# Patient Record
Sex: FEMALE | Race: WHITE | NOT HISPANIC OR LATINO | Employment: PART TIME | URBAN - NONMETROPOLITAN AREA
[De-identification: names, ages, dates, MRNs, and addresses within clinical notes are randomized per-mention and may not be internally consistent; named-entity substitution may affect disease eponyms.]

---

## 2018-10-11 ENCOUNTER — HISTORICAL (OUTPATIENT)
Dept: ADMINISTRATIVE | Facility: HOSPITAL | Age: 78
End: 2018-10-11

## 2019-04-16 ENCOUNTER — HISTORICAL (OUTPATIENT)
Dept: ADMINISTRATIVE | Facility: HOSPITAL | Age: 79
End: 2019-04-16

## 2019-09-03 ENCOUNTER — HISTORICAL (OUTPATIENT)
Dept: ADMINISTRATIVE | Facility: HOSPITAL | Age: 79
End: 2019-09-03

## 2019-10-04 ENCOUNTER — HISTORICAL (OUTPATIENT)
Dept: ADMINISTRATIVE | Facility: HOSPITAL | Age: 79
End: 2019-10-04

## 2019-10-15 ENCOUNTER — HISTORICAL (OUTPATIENT)
Dept: ADMINISTRATIVE | Facility: HOSPITAL | Age: 79
End: 2019-10-15

## 2020-05-05 ENCOUNTER — HISTORICAL (OUTPATIENT)
Dept: ADMINISTRATIVE | Facility: HOSPITAL | Age: 80
End: 2020-05-05

## 2020-11-11 LAB
ALBUMIN SERPL-MCNC: 4 G/DL (ref 3.4–5)
ALBUMIN/GLOB SERPL: 1.5 {RATIO}
ALP SERPL-CCNC: 79 U/L (ref 50–144)
ALT SERPL-CCNC: 15 U/L (ref 1–45)
ANION GAP SERPL CALC-SCNC: 7 MMOL/L (ref 7–16)
AST SERPL-CCNC: 32 U/L (ref 14–36)
BASOPHILS # BLD AUTO: 0.02 X10(3)/MCL (ref 0.01–0.08)
BASOPHILS NFR BLD AUTO: 0.3 % (ref 0.1–1.2)
BILIRUB SERPL-MCNC: 0.43 MG/DL (ref 0.1–1)
BUN SERPL-MCNC: 12 MG/DL (ref 7–20)
CALCIUM SERPL-MCNC: 10 MG/DL (ref 8.4–10.2)
CHLORIDE SERPL-SCNC: 100 MMOL/L (ref 94–110)
CHOLEST SERPL-MCNC: 152 MG/DL (ref 0–200)
CO2 SERPL-SCNC: 29 MMOL/L (ref 21–32)
CREAT SERPL-MCNC: 0.5 MG/DL (ref 0.52–1.04)
CREAT/UREA NIT SERPL: 24 (ref 12–20)
EOSINOPHIL # BLD AUTO: 0.14 X10(3)/MCL (ref 0.04–0.36)
EOSINOPHIL NFR BLD AUTO: 1.9 % (ref 0.7–7)
ERYTHROCYTE [DISTWIDTH] IN BLOOD BY AUTOMATED COUNT: 13.8 % (ref 11–14.5)
GLOBULIN SER-MCNC: 2.7 G/DL (ref 2–3.9)
GLUCOSE SERPL-MCNC: 107 MGM./DL (ref 70–115)
HCT VFR BLD AUTO: 42.7 % (ref 36–48)
HDLC SERPL-MCNC: 64 MG/DL (ref 40–60)
HGB BLD-MCNC: 14 G/DL (ref 11.8–16)
IMM GRANULOCYTES # BLD AUTO: 0.02 10*3/UL (ref 0–0.03)
IMM GRANULOCYTES NFR BLD AUTO: 0.3 % (ref 0–0.5)
LDLC SERPL CALC-MCNC: 59.8 MG/DL (ref 30–100)
LYMPHOCYTES # BLD AUTO: 2.69 X10(3)/MCL (ref 1.16–3.74)
LYMPHOCYTES NFR BLD AUTO: 36.8 % (ref 20–55)
MCH RBC QN AUTO: 31 PG (ref 27–34)
MCHC RBC AUTO-ENTMCNC: 32.8 G/DL (ref 31–37)
MCV RBC AUTO: 94.5 FL (ref 79–99)
MONOCYTES # BLD AUTO: 0.77 X10(3)/MCL (ref 0.24–0.36)
MONOCYTES NFR BLD AUTO: 10.5 % (ref 4.7–12.5)
NEUTROPHILS # BLD AUTO: 3.66 X10(3)/MCL (ref 1.56–6.13)
NEUTROPHILS NFR BLD AUTO: 50.2 % (ref 37–73)
PLATELET # BLD AUTO: 246 X10(3)/MCL (ref 140–371)
PMV BLD AUTO: 10.5 FL (ref 9.4–12.4)
POTASSIUM SERPL-SCNC: 4.6 MMOL/L (ref 3.5–5.1)
PROT SERPL-MCNC: 6.7 G/DL (ref 6.3–8.2)
RBC # BLD AUTO: 4.52 X10(6)/MCL (ref 4–5.1)
SODIUM SERPL-SCNC: 136 MMOL/L (ref 135–145)
TRIGL SERPL-MCNC: 80 MG/DL (ref 30–200)
TSH SERPL-ACNC: 4.96 UIU/ML (ref 0.36–3.74)
WBC # SPEC AUTO: 7.3 X10(3)/MCL (ref 4–11.5)

## 2021-03-10 LAB
ALBUMIN SERPL-MCNC: 4.2 G/DL (ref 3.4–5)
ALBUMIN/GLOB SERPL: 1.8 {RATIO}
ALP SERPL-CCNC: 85 U/L (ref 50–144)
ALT SERPL-CCNC: 16 U/L (ref 1–45)
ANION GAP SERPL CALC-SCNC: 5 MMOL/L (ref 7–16)
AST SERPL-CCNC: 28 U/L (ref 14–36)
BASOPHILS # BLD AUTO: 0.03 X10(3)/MCL (ref 0.01–0.08)
BASOPHILS NFR BLD AUTO: 0.5 % (ref 0.1–1.2)
BILIRUB SERPL-MCNC: 0.61 MG/DL (ref 0.1–1)
BUN SERPL-MCNC: 13 MG/DL (ref 7–20)
CALCIUM SERPL-MCNC: 10.2 MG/DL (ref 8.4–10.2)
CHLORIDE SERPL-SCNC: 98 MMOL/L (ref 94–110)
CHOLEST SERPL-MCNC: 153 MG/DL (ref 0–200)
CO2 SERPL-SCNC: 30 MMOL/L (ref 21–32)
CREAT SERPL-MCNC: 0.6 MG/DL (ref 0.52–1.04)
CREAT/UREA NIT SERPL: 21.7 (ref 12–20)
DEPRECATED CALCIDIOL+CALCIFEROL SERPL-MC: 28 NG/ML (ref 30–100)
EOSINOPHIL # BLD AUTO: 0.3 X10(3)/MCL (ref 0.04–0.36)
EOSINOPHIL NFR BLD AUTO: 5.2 % (ref 0.7–7)
ERYTHROCYTE [DISTWIDTH] IN BLOOD BY AUTOMATED COUNT: 13.6 % (ref 11–14.5)
EST. AVERAGE GLUCOSE BLD GHB EST-MCNC: 108 MG/DL (ref 70–115)
GLOBULIN SER-MCNC: 2.4 G/DL (ref 2–3.9)
GLUCOSE SERPL-MCNC: 120 MGM./DL (ref 70–115)
HBA1C MFR BLD: 5.6 % (ref 4–6)
HCT VFR BLD AUTO: 44.5 % (ref 36–48)
HDLC SERPL-MCNC: 65 MG/DL (ref 40–60)
HGB BLD-MCNC: 14.4 G/DL (ref 11.8–16)
IMM GRANULOCYTES # BLD AUTO: 0.01 X10E3/UL (ref 0–0.03)
IMM GRANULOCYTES NFR BLD AUTO: 0.2 % (ref 0–0.5)
LDLC SERPL CALC-MCNC: 68.5 MG/DL (ref 30–100)
LYMPHOCYTES # BLD AUTO: 1.9 X10(3)/MCL (ref 1.16–3.74)
LYMPHOCYTES NFR BLD AUTO: 32.7 % (ref 20–55)
MCH RBC QN AUTO: 30.6 PG (ref 27–34)
MCHC RBC AUTO-ENTMCNC: 32.4 G/DL (ref 31–37)
MCV RBC AUTO: 94.5 FL (ref 79–99)
MONOCYTES # BLD AUTO: 0.63 X10(3)/MCL (ref 0.24–0.36)
MONOCYTES NFR BLD AUTO: 10.8 % (ref 4.7–12.5)
NEUTROPHILS # BLD AUTO: 2.94 X10(3)/MCL (ref 1.56–6.13)
NEUTROPHILS NFR BLD AUTO: 50.6 % (ref 37–73)
PLATELET # BLD AUTO: 239 X10(3)/MCL (ref 140–371)
PMV BLD AUTO: 10.4 FL (ref 9.4–12.4)
POTASSIUM SERPL-SCNC: 4.5 MMOL/L (ref 3.5–5.1)
PROT SERPL-MCNC: 6.6 G/DL (ref 6.3–8.2)
RBC # BLD AUTO: 4.71 X10(6)/MCL (ref 4–5.1)
SODIUM SERPL-SCNC: 133 MMOL/L (ref 135–145)
TRIGL SERPL-MCNC: 85 MG/DL (ref 30–200)
TSH SERPL-ACNC: 3.59 UIU/ML (ref 0.36–3.74)
WBC # SPEC AUTO: 5.8 X10(3)/MCL (ref 4–11.5)

## 2021-05-13 LAB
ALBUMIN SERPL-MCNC: 4.4 G/DL (ref 3.4–5)
ALBUMIN/GLOB SERPL: 1.8 {RATIO}
ALP SERPL-CCNC: 83 U/L (ref 50–144)
ALT SERPL-CCNC: 17 U/L (ref 1–45)
ANION GAP SERPL CALC-SCNC: 6 MMOL/L (ref 7–16)
AST SERPL-CCNC: 24 U/L (ref 14–36)
BASOPHILS # BLD AUTO: 0.03 X10(3)/MCL (ref 0.01–0.08)
BASOPHILS NFR BLD AUTO: 0.5 % (ref 0.1–1.2)
BILIRUB SERPL-MCNC: 0.66 MG/DL (ref 0.1–1)
BUN SERPL-MCNC: 13 MG/DL (ref 7–20)
CALCIUM SERPL-MCNC: 10.2 MG/DL (ref 8.4–10.2)
CHLORIDE SERPL-SCNC: 102 MMOL/L (ref 94–110)
CHOLEST SERPL-MCNC: 149 MG/DL (ref 0–200)
CO2 SERPL-SCNC: 29 MMOL/L (ref 21–32)
CREAT SERPL-MCNC: 0.6 MG/DL (ref 0.52–1.04)
CREAT/UREA NIT SERPL: 21.7 (ref 12–20)
DEPRECATED CALCIDIOL+CALCIFEROL SERPL-MC: 32.4 NG/ML (ref 30–100)
EOSINOPHIL # BLD AUTO: 0.34 X10(3)/MCL (ref 0.04–0.36)
EOSINOPHIL NFR BLD AUTO: 5.7 % (ref 0.7–7)
ERYTHROCYTE [DISTWIDTH] IN BLOOD BY AUTOMATED COUNT: 13.6 % (ref 11–14.5)
EST. AVERAGE GLUCOSE BLD GHB EST-MCNC: 114 MG/DL (ref 70–115)
GLOBULIN SER-MCNC: 2.5 G/DL (ref 2–3.9)
GLUCOSE SERPL-MCNC: 100 MGM./DL (ref 70–115)
HBA1C MFR BLD: 5.8 % (ref 4–6)
HCT VFR BLD AUTO: 44.4 % (ref 36–48)
HDLC SERPL-MCNC: 62 MG/DL (ref 40–60)
HGB BLD-MCNC: 14.2 G/DL (ref 11.8–16)
IMM GRANULOCYTES # BLD AUTO: 0.02 X10E3/UL (ref 0–0.03)
IMM GRANULOCYTES NFR BLD AUTO: 0.3 % (ref 0–0.5)
IRON SERPL-MCNC: 91 MCG/DL (ref 37–170)
LDLC SERPL CALC-MCNC: 68.3 MG/DL (ref 30–100)
LYMPHOCYTES # BLD AUTO: 2.01 X10(3)/MCL (ref 1.16–3.74)
LYMPHOCYTES NFR BLD AUTO: 33.7 % (ref 20–55)
MCH RBC QN AUTO: 30.7 PG (ref 27–34)
MCHC RBC AUTO-ENTMCNC: 32 G/DL (ref 31–37)
MCV RBC AUTO: 95.9 FL (ref 79–99)
MONOCYTES # BLD AUTO: 0.75 X10(3)/MCL (ref 0.24–0.36)
MONOCYTES NFR BLD AUTO: 12.6 % (ref 4.7–12.5)
NEUTROPHILS # BLD AUTO: 2.81 X10(3)/MCL (ref 1.56–6.13)
NEUTROPHILS NFR BLD AUTO: 47.2 % (ref 37–73)
PLATELET # BLD AUTO: 188 X10(3)/MCL (ref 140–371)
PMV BLD AUTO: 11.2 FL (ref 9.4–12.4)
POTASSIUM SERPL-SCNC: 4.5 MMOL/L (ref 3.5–5.1)
PROT SERPL-MCNC: 6.9 G/DL (ref 6.3–8.2)
RBC # BLD AUTO: 4.63 X10(6)/MCL (ref 4–5.1)
SODIUM SERPL-SCNC: 137 MMOL/L (ref 135–145)
TRIGL SERPL-MCNC: 79 MG/DL (ref 30–200)
TSH SERPL-ACNC: 4.4 UIU/ML (ref 0.36–3.74)
VIT B12 SERPL-MCNC: 510 PG/ML (ref 211–946)
WBC # SPEC AUTO: 6 X10(3)/MCL (ref 4–11.5)

## 2021-10-13 LAB
ALBUMIN SERPL-MCNC: 4.3 G/DL (ref 3.4–5)
ALBUMIN/GLOB SERPL: 2 {RATIO}
ALP SERPL-CCNC: 101 U/L (ref 50–144)
ALT SERPL-CCNC: 20 U/L (ref 1–45)
ANION GAP SERPL CALC-SCNC: 8 MMOL/L (ref 7–16)
AST SERPL-CCNC: 26 U/L (ref 14–36)
BASOPHILS # BLD AUTO: 0.04 X10(3)/MCL (ref 0.01–0.08)
BASOPHILS NFR BLD AUTO: 0.6 % (ref 0.1–1.2)
BILIRUB SERPL-MCNC: 0.53 MG/DL (ref 0.1–1)
BUN SERPL-MCNC: 16 MG/DL (ref 7–20)
CALCIUM SERPL-MCNC: 10 MG/DL (ref 8.4–10.2)
CHLORIDE SERPL-SCNC: 101 MMOL/L (ref 94–110)
CHOLEST SERPL-MCNC: 152 MG/DL (ref 0–200)
CO2 SERPL-SCNC: 28 MMOL/L (ref 21–32)
CREAT SERPL-MCNC: 0.59 MG/DL (ref 0.52–1.04)
CREAT/UREA NIT SERPL: 27.1 (ref 12–20)
DEPRECATED CALCIDIOL+CALCIFEROL SERPL-MC: 29.3 NG/ML (ref 30–100)
EOSINOPHIL # BLD AUTO: 0.28 X10(3)/MCL (ref 0.04–0.36)
EOSINOPHIL NFR BLD AUTO: 4.5 % (ref 0.7–7)
ERYTHROCYTE [DISTWIDTH] IN BLOOD BY AUTOMATED COUNT: 13.6 % (ref 11–14.5)
EST. AVERAGE GLUCOSE BLD GHB EST-MCNC: 108 MG/DL (ref 70–115)
GLOBULIN SER-MCNC: 2.2 G/DL (ref 2–3.9)
GLUCOSE SERPL-MCNC: 111 MGM./DL (ref 70–115)
HBA1C MFR BLD: 5.6 % (ref 4–6)
HCT VFR BLD AUTO: 42.6 % (ref 36–48)
HDLC SERPL-MCNC: 60 MG/DL (ref 40–60)
HGB BLD-MCNC: 14.1 G/DL (ref 11.8–16)
IMM GRANULOCYTES # BLD AUTO: 0.02 X10E3/UL (ref 0–0.03)
IMM GRANULOCYTES NFR BLD AUTO: 0.3 % (ref 0–0.5)
IRON SERPL-MCNC: 96 MCG/DL (ref 37–170)
LDLC SERPL CALC-MCNC: 65.3 MG/DL (ref 30–100)
LYMPHOCYTES # BLD AUTO: 1.99 X10(3)/MCL (ref 1.16–3.74)
LYMPHOCYTES NFR BLD AUTO: 32.2 % (ref 20–55)
MCH RBC QN AUTO: 30.8 PG (ref 27–34)
MCHC RBC AUTO-ENTMCNC: 33.1 G/DL (ref 31–37)
MCV RBC AUTO: 93 FL (ref 79–99)
MONOCYTES # BLD AUTO: 0.61 X10(3)/MCL (ref 0.24–0.36)
MONOCYTES NFR BLD AUTO: 9.9 % (ref 4.7–12.5)
NEUTROPHILS # BLD AUTO: 3.24 X10(3)/MCL (ref 1.56–6.13)
NEUTROPHILS NFR BLD AUTO: 52.5 % (ref 37–73)
PLATELET # BLD AUTO: 190 X10(3)/MCL (ref 140–371)
PMV BLD AUTO: 10.7 FL (ref 9.4–12.4)
POTASSIUM SERPL-SCNC: 5.1 MMOL/L (ref 3.5–5.1)
PROT SERPL-MCNC: 6.5 G/DL (ref 6.3–8.2)
RBC # BLD AUTO: 4.58 X10(6)/MCL (ref 4–5.1)
SODIUM SERPL-SCNC: 137 MMOL/L (ref 135–145)
T4 FREE SERPL-MCNC: 1.12 NG/DL (ref 0.78–2.19)
TRIGL SERPL-MCNC: 80 MG/DL (ref 30–200)
TSH SERPL-ACNC: 3.44 UIU/ML (ref 0.36–3.74)
WBC # SPEC AUTO: 6.2 X10(3)/MCL (ref 4–11.5)

## 2022-04-10 ENCOUNTER — HISTORICAL (OUTPATIENT)
Dept: ADMINISTRATIVE | Facility: HOSPITAL | Age: 82
End: 2022-04-10

## 2022-04-24 VITALS
HEIGHT: 61 IN | BODY MASS INDEX: 37.76 KG/M2 | WEIGHT: 200 LBS | DIASTOLIC BLOOD PRESSURE: 62 MMHG | SYSTOLIC BLOOD PRESSURE: 116 MMHG | OXYGEN SATURATION: 97 %

## 2022-05-05 ENCOUNTER — HISTORICAL (OUTPATIENT)
Dept: ADMINISTRATIVE | Facility: HOSPITAL | Age: 82
End: 2022-05-05

## 2022-11-01 LAB
HBA1C MFR BLD: 5.6 %
HEMOGLOBIN A1C: 5.6 % (ref ?–5.6)

## 2023-03-20 ENCOUNTER — OFFICE VISIT (OUTPATIENT)
Dept: FAMILY MEDICINE | Facility: CLINIC | Age: 83
End: 2023-03-20
Payer: MEDICARE

## 2023-03-20 VITALS
HEART RATE: 101 BPM | RESPIRATION RATE: 20 BRPM | OXYGEN SATURATION: 94 % | WEIGHT: 200 LBS | DIASTOLIC BLOOD PRESSURE: 73 MMHG | BODY MASS INDEX: 37.76 KG/M2 | SYSTOLIC BLOOD PRESSURE: 133 MMHG | TEMPERATURE: 98 F | HEIGHT: 61 IN

## 2023-03-20 DIAGNOSIS — R05.9 COUGH, UNSPECIFIED TYPE: ICD-10-CM

## 2023-03-20 DIAGNOSIS — J32.9 SINUSITIS, UNSPECIFIED CHRONICITY, UNSPECIFIED LOCATION: Primary | ICD-10-CM

## 2023-03-20 PROBLEM — E55.9 VITAMIN D DEFICIENCY: Status: ACTIVE | Noted: 2023-03-20

## 2023-03-20 PROBLEM — R73.03 PREDIABETES: Status: ACTIVE | Noted: 2023-03-20

## 2023-03-20 PROBLEM — J30.2 SEASONAL ALLERGIC RHINITIS: Status: ACTIVE | Noted: 2023-03-20

## 2023-03-20 PROBLEM — E78.2 MIXED HYPERLIPIDEMIA: Status: ACTIVE | Noted: 2023-03-20

## 2023-03-20 PROBLEM — I10 PRIMARY HYPERTENSION: Status: ACTIVE | Noted: 2023-03-20

## 2023-03-20 PROCEDURE — 96372 PR INJECTION,THERAP/PROPH/DIAG2ST, IM OR SUBCUT: ICD-10-PCS | Mod: ,,, | Performed by: NURSE PRACTITIONER

## 2023-03-20 PROCEDURE — 96372 THER/PROPH/DIAG INJ SC/IM: CPT | Mod: ,,, | Performed by: NURSE PRACTITIONER

## 2023-03-20 PROCEDURE — 99213 PR OFFICE/OUTPT VISIT, EST, LEVL III, 20-29 MIN: ICD-10-PCS | Mod: ,,, | Performed by: NURSE PRACTITIONER

## 2023-03-20 PROCEDURE — 99213 OFFICE O/P EST LOW 20 MIN: CPT | Mod: ,,, | Performed by: NURSE PRACTITIONER

## 2023-03-20 RX ORDER — CEFTRIAXONE 1 G/1
1 INJECTION, POWDER, FOR SOLUTION INTRAMUSCULAR; INTRAVENOUS
Status: COMPLETED | OUTPATIENT
Start: 2023-03-20 | End: 2023-03-20

## 2023-03-20 RX ORDER — ISOSORBIDE MONONITRATE 60 MG/1
60 TABLET, EXTENDED RELEASE ORAL DAILY
COMMUNITY
End: 2023-09-25 | Stop reason: SDUPTHER

## 2023-03-20 RX ORDER — DEXAMETHASONE SODIUM PHOSPHATE 100 MG/10ML
10 INJECTION INTRAMUSCULAR; INTRAVENOUS
Status: COMPLETED | OUTPATIENT
Start: 2023-03-20 | End: 2023-03-20

## 2023-03-20 RX ORDER — ATORVASTATIN CALCIUM 40 MG/1
40 TABLET, FILM COATED ORAL DAILY
COMMUNITY
End: 2023-05-22 | Stop reason: SDUPTHER

## 2023-03-20 RX ORDER — MELOXICAM 7.5 MG/1
7.5 TABLET ORAL DAILY
COMMUNITY
End: 2023-07-18

## 2023-03-20 RX ORDER — PANTOPRAZOLE SODIUM 40 MG/1
40 TABLET, DELAYED RELEASE ORAL DAILY
COMMUNITY
End: 2023-09-25 | Stop reason: SDUPTHER

## 2023-03-20 RX ORDER — AMLODIPINE BESYLATE 10 MG/1
10 TABLET ORAL DAILY
COMMUNITY
End: 2023-05-22 | Stop reason: SDUPTHER

## 2023-03-20 RX ADMIN — DEXAMETHASONE SODIUM PHOSPHATE 10 MG: 100 INJECTION INTRAMUSCULAR; INTRAVENOUS at 01:03

## 2023-03-20 RX ADMIN — CEFTRIAXONE 1 G: 1 INJECTION, POWDER, FOR SOLUTION INTRAMUSCULAR; INTRAVENOUS at 01:03

## 2023-03-20 NOTE — PROGRESS NOTES
Subjective:       Patient ID: Leigh Ann Rubio is a 83 y.o. female.    Chief Complaint: Sore Throat (Pt is here for sore throat, cough and ear ache for the last three days, she has been taking mucinex and cough syrup with no relief. )    Sore Throat   This is a new problem. The current episode started in the past 7 days. The problem has been unchanged. There has been no fever. The fever has been present for Less than 1 day. The pain is mild. Associated symptoms include congestion, coughing, headaches and a plugged ear sensation. Pertinent negatives include no diarrhea or shortness of breath. She has tried NSAIDs for the symptoms.   Review of Systems   HENT:  Positive for nasal congestion and sore throat.    Respiratory:  Positive for cough. Negative for shortness of breath.    Gastrointestinal:  Negative for diarrhea.   Neurological:  Positive for headaches.       Objective:      Physical Exam  Vitals and nursing note reviewed.   Constitutional:       Appearance: Normal appearance.   HENT:      Head: Normocephalic and atraumatic.      Right Ear: Tympanic membrane, ear canal and external ear normal.      Left Ear: Tympanic membrane, ear canal and external ear normal.      Nose: Nose normal.      Mouth/Throat:      Mouth: Mucous membranes are moist.      Pharynx: Oropharynx is clear. Posterior oropharyngeal erythema present.   Eyes:      Extraocular Movements: Extraocular movements intact.      Conjunctiva/sclera: Conjunctivae normal.      Pupils: Pupils are equal, round, and reactive to light.   Cardiovascular:      Rate and Rhythm: Normal rate and regular rhythm.      Pulses: Normal pulses.      Heart sounds: Normal heart sounds.   Pulmonary:      Effort: Pulmonary effort is normal.      Breath sounds: Normal breath sounds.   Abdominal:      General: Abdomen is flat. Bowel sounds are normal.      Palpations: Abdomen is soft.   Musculoskeletal:         General: Normal range of motion.      Cervical back: Normal range  of motion.   Skin:     General: Skin is warm and dry.      Capillary Refill: Capillary refill takes less than 2 seconds.   Neurological:      General: No focal deficit present.      Mental Status: She is alert.   Psychiatric:         Mood and Affect: Mood normal.         Behavior: Behavior normal.         Thought Content: Thought content normal.         Judgment: Judgment normal.       Assessment/Plan:     Vitals:    03/20/23 1317   BP: 133/73   Pulse: 101   Resp: 20   Temp: 98.3 °F (36.8 °C)        1. Sinusitis, unspecified chronicity, unspecified location  Assessment & Plan:  dexamethasone and rocephin administered      2. Cough, unspecified type  -     codeine-guaiFENesin 6.3-100 mg/5 mL Liqd; Take 5 mLs by mouth nightly as needed (cough).  Dispense: 50 mL; Refill: 0           Follow up if symptoms worsen or fail to improve.   Discussed the diagnosis, prognosis, plan of care, chronic nature of and indications for, risks and benefits of treatment for conditions.  Continue all medications as prescribed unless otherwise noted.   Call if patient develops other symptoms or if not better in 2-3 days and sooner if worse. Emphasized the importance of compliance with all recommendations, including medication use and timely follow up. Instructed to return as noted be or sooner if patient develops any other problems or if there are any other additional questions or concerns.

## 2023-05-15 PROCEDURE — 80061 LIPID PANEL: CPT | Performed by: NURSE PRACTITIONER

## 2023-05-15 PROCEDURE — 84439 ASSAY OF FREE THYROXINE: CPT | Performed by: NURSE PRACTITIONER

## 2023-05-15 PROCEDURE — 80053 COMPREHEN METABOLIC PANEL: CPT | Performed by: NURSE PRACTITIONER

## 2023-05-15 PROCEDURE — 84443 ASSAY THYROID STIM HORMONE: CPT | Performed by: NURSE PRACTITIONER

## 2023-05-15 PROCEDURE — 82306 VITAMIN D 25 HYDROXY: CPT | Performed by: NURSE PRACTITIONER

## 2023-05-22 ENCOUNTER — OFFICE VISIT (OUTPATIENT)
Dept: FAMILY MEDICINE | Facility: CLINIC | Age: 83
End: 2023-05-22
Payer: MEDICARE

## 2023-05-22 VITALS
HEIGHT: 61 IN | RESPIRATION RATE: 20 BRPM | HEART RATE: 87 BPM | TEMPERATURE: 97 F | WEIGHT: 201 LBS | DIASTOLIC BLOOD PRESSURE: 70 MMHG | SYSTOLIC BLOOD PRESSURE: 118 MMHG | OXYGEN SATURATION: 98 % | BODY MASS INDEX: 37.95 KG/M2

## 2023-05-22 DIAGNOSIS — F32.A DEPRESSION, UNSPECIFIED DEPRESSION TYPE: ICD-10-CM

## 2023-05-22 DIAGNOSIS — Z79.899 LONG TERM CURRENT USE OF THERAPEUTIC DRUG: ICD-10-CM

## 2023-05-22 DIAGNOSIS — M19.90 ARTHRITIS: ICD-10-CM

## 2023-05-22 DIAGNOSIS — J30.2 SEASONAL ALLERGIC RHINITIS, UNSPECIFIED TRIGGER: ICD-10-CM

## 2023-05-22 DIAGNOSIS — E66.9 OBESITY, UNSPECIFIED CLASSIFICATION, UNSPECIFIED OBESITY TYPE, UNSPECIFIED WHETHER SERIOUS COMORBIDITY PRESENT: ICD-10-CM

## 2023-05-22 DIAGNOSIS — I10 HYPERTENSION, UNSPECIFIED TYPE: ICD-10-CM

## 2023-05-22 DIAGNOSIS — E78.2 MIXED HYPERLIPIDEMIA: Primary | ICD-10-CM

## 2023-05-22 DIAGNOSIS — I10 PRIMARY HYPERTENSION: ICD-10-CM

## 2023-05-22 PROCEDURE — 99214 OFFICE O/P EST MOD 30 MIN: CPT | Mod: ,,, | Performed by: NURSE PRACTITIONER

## 2023-05-22 PROCEDURE — 99214 PR OFFICE/OUTPT VISIT, EST, LEVL IV, 30-39 MIN: ICD-10-PCS | Mod: ,,, | Performed by: NURSE PRACTITIONER

## 2023-05-22 RX ORDER — LISINOPRIL 10 MG/1
TABLET ORAL
Qty: 180 TABLET | Refills: 1 | Status: SHIPPED | OUTPATIENT
Start: 2023-05-22 | End: 2023-11-07 | Stop reason: SDUPTHER

## 2023-05-22 RX ORDER — AZELASTINE 1 MG/ML
1 SPRAY, METERED NASAL 2 TIMES DAILY
Qty: 30 ML | Refills: 1 | Status: SHIPPED | OUTPATIENT
Start: 2023-05-22 | End: 2024-01-30

## 2023-05-22 RX ORDER — CITALOPRAM 40 MG/1
TABLET, FILM COATED ORAL
Qty: 90 TABLET | Refills: 3 | Status: SHIPPED | OUTPATIENT
Start: 2023-05-22 | End: 2023-11-07 | Stop reason: SDUPTHER

## 2023-05-22 RX ORDER — ATORVASTATIN CALCIUM 40 MG/1
40 TABLET, FILM COATED ORAL DAILY
Qty: 90 TABLET | Refills: 1 | Status: SHIPPED | OUTPATIENT
Start: 2023-05-22 | End: 2023-12-20

## 2023-05-22 RX ORDER — AMLODIPINE BESYLATE 10 MG/1
10 TABLET ORAL DAILY
Qty: 90 TABLET | Refills: 1 | Status: SHIPPED | OUTPATIENT
Start: 2023-05-22 | End: 2023-11-07 | Stop reason: SDUPTHER

## 2023-05-22 NOTE — PROGRESS NOTES
"Subjective:       Patient ID: Leigh Ann Rubio is a 83 y.o. female.    Chief Complaint: Follow-up (Pt here for follow up and review labs states her medicine is all working well and she has no new concerns or problems. )    The patient returns for follow up with lab and medications. Her lab good cmp, flp, tsh, vitamin D.  Send copy to Dr Wesley at [facility]. . Continue lipitor 40 qhs and tolerating well. Tolerating medication without side effects. Some allergies, blood pressure controlled. No bronchitis. Taking celexa and helping with sadness. Using baclofen if needed for back muscle pain. No blood in stool and normal Bmp, Taking mobic only 3-4 x week. Denied chest pain with current medications.     Review of Systems   Constitutional:  Negative for activity change, appetite change and fatigue.   HENT:  Negative for rhinorrhea.    Eyes:  Negative for discharge and eye dryness.   Respiratory:  Negative for cough, choking, shortness of breath and wheezing.    Cardiovascular:  Negative for chest pain, palpitations, leg swelling and claudication.   Gastrointestinal:  Negative for abdominal pain.   Endocrine: Negative for cold intolerance and heat intolerance.   Genitourinary:  Negative for difficulty urinating and nocturia.   Allergic/Immunologic: Positive for environmental allergies. Negative for food allergies.   Neurological:  Negative for speech difficulty, light-headedness, coordination difficulties and coordination difficulties.   Hematological:  Negative for adenopathy. Does not bruise/bleed easily.   Psychiatric/Behavioral:  Negative for confusion, dysphoric mood, self-injury and sleep disturbance.        Objective:      Vitals:    05/22/23 0850   BP: 118/70   Pulse: 87   Resp: 20   Temp: 97.3 °F (36.3 °C)   SpO2: 98%   Weight: 91.2 kg (201 lb)   Height: 5' 1" (1.549 m)       Physical Exam  Vitals reviewed.   Constitutional:       General: She is not in acute distress.     Appearance: She is not ill-appearing.   HENT: "      Head: Normocephalic and atraumatic.      Right Ear: Tympanic membrane normal.      Left Ear: Tympanic membrane normal.      Nose: Nose normal.      Mouth/Throat:      Mouth: Mucous membranes are moist.      Pharynx: Oropharynx is clear.   Eyes:      General: No scleral icterus.     Extraocular Movements: Extraocular movements intact.      Conjunctiva/sclera: Conjunctivae normal.      Pupils: Pupils are equal, round, and reactive to light.   Neck:      Vascular: No carotid bruit.   Cardiovascular:      Rate and Rhythm: Normal rate and regular rhythm.      Pulses: Normal pulses.      Heart sounds: Normal heart sounds. No murmur heard.    No friction rub. No gallop.   Pulmonary:      Effort: Pulmonary effort is normal. No respiratory distress.      Breath sounds: Normal breath sounds. No wheezing, rhonchi or rales.   Abdominal:      General: Abdomen is flat. Bowel sounds are normal.   Musculoskeletal:      Cervical back: Normal range of motion and neck supple.      Comments: Crepitation to knees and shoulders but full range of movement.    Neurological:      Mental Status: She is alert.       Assessment/Plan:     1. Mixed hyperlipidemia  Assessment & Plan:  The current medical regimen is effective;  continue present plan and medications.        2. Primary hypertension    3. Seasonal allergic rhinitis, unspecified trigger    4. Obesity, unspecified classification, unspecified obesity type, unspecified whether serious comorbidity present  Assessment & Plan:  Healthy heart diet discussed.       5. Arthritis  Assessment & Plan:  Taking mobic and bactrim as needed for pain and muscle spasm.          Follow up in about 6 months (around 11/22/2023).          Discussed the diagnosis, prognosis, plan of care, chronic nature of and indications for, risks and benefits of treatment for conditions.  Continue all medications as prescribed unless otherwise noted.   Call if patient develops other symptoms or if not better in 2-3  days and sooner if worse. Emphasized the importance of compliance with all recommendations, including medication use and timely follow up. Instructed to return as noted be or sooner if patient develops any other problems or if there are any other additional questions or concerns.

## 2023-05-22 NOTE — ASSESSMENT & PLAN NOTE
Discussed lab and chronic conditions stable. Medication order as needed. Discussed possible side effects, complications, and will check status of vaccines when able to administer.

## 2023-06-19 ENCOUNTER — HOSPITAL ENCOUNTER (OUTPATIENT)
Dept: RADIOLOGY | Facility: HOSPITAL | Age: 83
Discharge: HOME | End: 2023-06-19
Attending: SPECIALIST
Payer: MEDICARE

## 2023-06-19 DIAGNOSIS — M25.511 RIGHT SHOULDER PAIN: ICD-10-CM

## 2023-06-19 PROCEDURE — 73030 X-RAY EXAM OF SHOULDER: CPT | Mod: TC,RT

## 2023-06-20 ENCOUNTER — TELEPHONE (OUTPATIENT)
Dept: FAMILY MEDICINE | Facility: CLINIC | Age: 83
End: 2023-06-20
Payer: MEDICARE

## 2023-06-20 NOTE — TELEPHONE ENCOUNTER
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

## 2023-07-18 DIAGNOSIS — M19.90 ARTHRITIS: Primary | ICD-10-CM

## 2023-07-18 RX ORDER — MELOXICAM 7.5 MG/1
TABLET ORAL
Qty: 60 TABLET | Refills: 2 | Status: SHIPPED | OUTPATIENT
Start: 2023-07-18

## 2023-09-20 ENCOUNTER — TELEPHONE (OUTPATIENT)
Dept: FAMILY MEDICINE | Facility: CLINIC | Age: 83
End: 2023-09-20
Payer: MEDICARE

## 2023-09-20 NOTE — TELEPHONE ENCOUNTER
----- Message from Blanquita Teixeira sent at 9/20/2023  3:50 PM CDT -----  Regarding: Flu shot      She had a steroid shot in her shoulder yesterday and wants to know if there is a time frame she has to wait before she can come get the flu shot.      Call back [phone]    
Pt was told that she needs to wait 2 weeks.  
No Exposure

## 2023-09-25 ENCOUNTER — TELEPHONE (OUTPATIENT)
Dept: FAMILY MEDICINE | Facility: CLINIC | Age: 83
End: 2023-09-25
Payer: MEDICARE

## 2023-09-25 DIAGNOSIS — I10 PRIMARY HYPERTENSION: ICD-10-CM

## 2023-09-25 DIAGNOSIS — K21.9 GASTROESOPHAGEAL REFLUX DISEASE, UNSPECIFIED WHETHER ESOPHAGITIS PRESENT: Primary | ICD-10-CM

## 2023-09-25 RX ORDER — PANTOPRAZOLE SODIUM 40 MG/1
40 TABLET, DELAYED RELEASE ORAL DAILY
Qty: 90 TABLET | Refills: 0 | Status: SHIPPED | OUTPATIENT
Start: 2023-09-25 | End: 2023-11-07

## 2023-09-25 RX ORDER — ISOSORBIDE MONONITRATE 60 MG/1
60 TABLET, EXTENDED RELEASE ORAL 2 TIMES DAILY
Qty: 60 TABLET | Refills: 2 | Status: SHIPPED | OUTPATIENT
Start: 2023-09-25 | End: 2023-11-07 | Stop reason: SDUPTHER

## 2023-09-25 RX ORDER — ISOSORBIDE MONONITRATE 60 MG/1
60 TABLET, EXTENDED RELEASE ORAL DAILY
Qty: 90 TABLET | Refills: 0 | Status: SHIPPED | OUTPATIENT
Start: 2023-09-25 | End: 2023-09-25

## 2023-09-25 NOTE — TELEPHONE ENCOUNTER
----- Message from Oriana Xie sent at 9/25/2023  1:28 PM CDT -----  Patient needs refill on Generic Protonix 40 mg and generic Imdur 60 mg called in to [facility].  Patient has switched all care to Neena and was told by Neena to call when she needed refills on these two medications

## 2023-10-18 ENCOUNTER — OFFICE VISIT (OUTPATIENT)
Dept: FAMILY MEDICINE | Facility: CLINIC | Age: 83
End: 2023-10-18
Payer: MEDICARE

## 2023-10-18 VITALS
HEIGHT: 61 IN | WEIGHT: 205 LBS | OXYGEN SATURATION: 96 % | DIASTOLIC BLOOD PRESSURE: 58 MMHG | BODY MASS INDEX: 38.71 KG/M2 | RESPIRATION RATE: 20 BRPM | TEMPERATURE: 98 F | HEART RATE: 106 BPM | SYSTOLIC BLOOD PRESSURE: 120 MMHG

## 2023-10-18 DIAGNOSIS — I10 PRIMARY HYPERTENSION: ICD-10-CM

## 2023-10-18 DIAGNOSIS — J02.9 SORE THROAT: ICD-10-CM

## 2023-10-18 DIAGNOSIS — J40 BRONCHITIS: ICD-10-CM

## 2023-10-18 DIAGNOSIS — J02.9 VIRAL PHARYNGITIS: Primary | ICD-10-CM

## 2023-10-18 LAB
CTP QC/QA: YES
FLUAV AG NPH QL: NEGATIVE
FLUBV AG NPH QL: NEGATIVE
S PYO RRNA THROAT QL PROBE: NEGATIVE
SARS-COV-2 AG RESP QL IA.RAPID: NEGATIVE

## 2023-10-18 PROCEDURE — 99213 PR OFFICE/OUTPT VISIT, EST, LEVL III, 20-29 MIN: ICD-10-PCS | Mod: ,,, | Performed by: NURSE PRACTITIONER

## 2023-10-18 PROCEDURE — 87880 STREP A ASSAY W/OPTIC: CPT | Mod: QW,RHCUB | Performed by: NURSE PRACTITIONER

## 2023-10-18 PROCEDURE — 96372 THER/PROPH/DIAG INJ SC/IM: CPT | Mod: ,,, | Performed by: NURSE PRACTITIONER

## 2023-10-18 PROCEDURE — 87400 INFLUENZA A/B EACH AG IA: CPT | Mod: QW,RHCUB | Performed by: NURSE PRACTITIONER

## 2023-10-18 PROCEDURE — 96372 PR INJECTION,THERAP/PROPH/DIAG2ST, IM OR SUBCUT: ICD-10-PCS | Mod: ,,, | Performed by: NURSE PRACTITIONER

## 2023-10-18 PROCEDURE — 99213 OFFICE O/P EST LOW 20 MIN: CPT | Mod: ,,, | Performed by: NURSE PRACTITIONER

## 2023-10-18 PROCEDURE — 87811 SARS-COV-2 COVID19 W/OPTIC: CPT | Mod: QW,RHCUB | Performed by: NURSE PRACTITIONER

## 2023-10-18 RX ORDER — BUDESONIDE AND FORMOTEROL FUMARATE DIHYDRATE 160; 4.5 UG/1; UG/1
2 AEROSOL RESPIRATORY (INHALATION) EVERY 12 HOURS
Qty: 10.2 G | Refills: 1 | Status: SHIPPED | OUTPATIENT
Start: 2023-10-18 | End: 2024-01-30

## 2023-10-18 RX ORDER — ALBUTEROL SULFATE 0.83 MG/ML
2.5 SOLUTION RESPIRATORY (INHALATION) EVERY 8 HOURS PRN
Qty: 25 EACH | Refills: 1 | Status: SHIPPED | OUTPATIENT
Start: 2023-10-18 | End: 2024-01-30

## 2023-10-18 RX ORDER — BUDESONIDE AND FORMOTEROL FUMARATE DIHYDRATE 160; 4.5 UG/1; UG/1
2 AEROSOL RESPIRATORY (INHALATION) EVERY 12 HOURS
Qty: 10.2 G | Refills: 1 | Status: SHIPPED | OUTPATIENT
Start: 2023-10-18 | End: 2023-10-18 | Stop reason: SDUPTHER

## 2023-10-18 RX ORDER — BENZONATATE 200 MG/1
200 CAPSULE ORAL 3 TIMES DAILY PRN
Qty: 20 CAPSULE | Refills: 1 | Status: SHIPPED | OUTPATIENT
Start: 2023-10-18 | End: 2023-11-07 | Stop reason: ALTCHOICE

## 2023-10-18 RX ORDER — DEXAMETHASONE SODIUM PHOSPHATE 100 MG/10ML
10 INJECTION INTRAMUSCULAR; INTRAVENOUS
Status: COMPLETED | OUTPATIENT
Start: 2023-10-18 | End: 2023-10-18

## 2023-10-18 RX ADMIN — DEXAMETHASONE SODIUM PHOSPHATE 10 MG: 100 INJECTION INTRAMUSCULAR; INTRAVENOUS at 02:10

## 2023-10-18 NOTE — ASSESSMENT & PLAN NOTE
Take the Coricidin if needed for the sinus congestion continue Astelin nose spray with nasal irrigation and can add Mucinex plain if needed for the cough but call any worsening or any changes.

## 2023-10-18 NOTE — ASSESSMENT & PLAN NOTE
Began with 2 days of congestion but now experiencing wheezing has had history of recurrent bronchitis never smoked no history of asthma we will store Symbicort 164.52 puffs twice daily with albuterol up to 3 times a day if needed.  Continue the Mucinex and add Coricidin for the sinus congestion.

## 2023-10-18 NOTE — PROGRESS NOTES
"Subjective:       Patient ID: Leigh Ann Rubio is a 83 y.o. female.    Chief Complaint: Sore Throat and Nasal Congestion (Cough and earache. Started 2-3 days ago. States that she has been taking zyrtec and mucinex. )    Patient returns with complaint of cough sinus congestion sore throat.  She experiences recurrent bronchitis, but symptoms started two days ago with sinus congestion and cough with ear discomfort. She has history of recurrent rhino sinusitis. 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       Review of Systems   Constitutional:  Negative for activity change and appetite change.   HENT:  Positive for nasal congestion, rhinorrhea, sinus pressure/congestion and sore throat. Negative for trouble swallowing and voice change.    Eyes: Negative.  Negative for visual disturbance.   Respiratory:  Positive for cough. Negative for chest tightness, shortness of breath and wheezing.    Cardiovascular:  Negative for chest pain, palpitations and leg swelling.   Endocrine: Negative for cold intolerance, heat intolerance and polyuria.   Genitourinary:  Negative for bladder incontinence, difficulty urinating, flank pain and frequency.   Allergic/Immunologic: Negative for environmental allergies and food allergies.   Neurological:  Negative for vertigo, tremors, seizures, syncope, light-headedness, headaches and coordination difficulties.   Hematological:  Negative for adenopathy. Does not bruise/bleed easily.   Psychiatric/Behavioral:  Negative for agitation, sleep disturbance and suicidal ideas.          Objective:      Vitals:    10/18/23 1400   BP: (!) 120/58   Pulse: 106   Resp: 20   Temp: 98.4 °F (36.9 °C)   SpO2: 96%   Weight: 93 kg (205 lb)   Height: 5' 1" (1.549 m)       Physical Exam  Constitutional:       Appearance: Normal appearance.   HENT:      Head: Normocephalic.      Nose: Nose normal.      Right Turbinates: Enlarged.      Left Turbinates: " Enlarged and swollen.      Mouth/Throat:      Mouth: Mucous membranes are dry.      Pharynx: Pharyngeal swelling and posterior oropharyngeal erythema present. No oropharyngeal exudate.   Eyes:      Extraocular Movements: Extraocular movements intact.      Conjunctiva/sclera: Conjunctivae normal.   Cardiovascular:      Rate and Rhythm: Normal rate and regular rhythm.      Pulses: Normal pulses.   Pulmonary:      Effort: Pulmonary effort is normal.      Breath sounds: Normal breath sounds.   Abdominal:      General: Bowel sounds are normal.      Palpations: Abdomen is soft.   Musculoskeletal:         General: Normal range of motion.      Cervical back: Normal range of motion.   Skin:     General: Skin is warm and dry.      Capillary Refill: Capillary refill takes less than 2 seconds.   Neurological:      General: No focal deficit present.      Mental Status: She is alert.   Psychiatric:         Mood and Affect: Mood normal.         Assessment/Plan:     1. Viral pharyngitis  Assessment & Plan:  Take the Coricidin if needed for the sinus congestion continue Astelin nose spray with nasal irrigation and can add Mucinex plain if needed for the cough but call any worsening or any changes.      2. Sore throat  Assessment & Plan:  Negative strept, flu and covid    Orders:  -     POCT Influenza A/B  -     POCT rapid strep A  -     SARS Coronavirus 2 Antigen, POCT Manual Read    3. Bronchitis  Assessment & Plan:  Began with 2 days of congestion but now experiencing wheezing has had history of recurrent bronchitis never smoked no history of asthma we will store Symbicort 164.52 puffs twice daily with albuterol up to 3 times a day if needed.  Continue the Mucinex and add Coricidin for the sinus congestion.    Orders:  -     albuterol (PROVENTIL) 2.5 mg /3 mL (0.083 %) nebulizer solution; Take 3 mLs (2.5 mg total) by nebulization every 8 (eight) hours as needed for Wheezing.  Dispense: 25 each; Refill: 1  -     Discontinue:  budesonide-formoterol 160-4.5 mcg (SYMBICORT) 160-4.5 mcg/actuation HFAA; Inhale 2 puffs into the lungs every 12 (twelve) hours. Controller  Dispense: 10.2 g; Refill: 1  -     benzonatate (TESSALON) 200 MG capsule; Take 1 capsule (200 mg total) by mouth 3 (three) times daily as needed for Cough.  Dispense: 20 capsule; Refill: 1  -     dexAMETHasone injection 10 mg  -     budesonide-formoterol 160-4.5 mcg (SYMBICORT) 160-4.5 mcg/actuation HFAA; Inhale 2 puffs into the lungs every 12 (twelve) hours. Controller  Dispense: 10.2 g; Refill: 1    4. Primary hypertension  Assessment & Plan:  norvasc 10 mg, lisinopril 10 mg daily. The current medical regimen is effective;  continue present plan and medications.           Follow up in about 2 weeks (around 11/1/2023).          Discussed the diagnosis, prognosis, plan of care, chronic nature of and indications for, risks and benefits of treatment for conditions.  Continue all medications as prescribed unless otherwise noted.   Call if patient develops other symptoms or if not better in 2-3 days and sooner if worse. Emphasized the importance of compliance with all recommendations, including medication use and timely follow up. Instructed to return as noted be or sooner if patient develops any other problems or if there are any other additional questions or concerns.

## 2023-10-18 NOTE — ASSESSMENT & PLAN NOTE
norvasc 10 mg, lisinopril 10 mg daily. The current medical regimen is effective;  continue present plan and medications.

## 2023-10-18 NOTE — PATIENT INSTRUCTIONS
Negative strep negative flu and negative COVID infection at this time we will treat the symptoms but if any worsening notify office immediately for additional treatment.

## 2023-10-23 DIAGNOSIS — R05.9 COUGH, UNSPECIFIED TYPE: ICD-10-CM

## 2023-10-23 DIAGNOSIS — J32.9 SINUSITIS, UNSPECIFIED CHRONICITY, UNSPECIFIED LOCATION: Primary | ICD-10-CM

## 2023-10-23 RX ORDER — AMOXICILLIN 875 MG/1
875 TABLET, FILM COATED ORAL 2 TIMES DAILY
Qty: 14 TABLET | Refills: 0 | Status: SHIPPED | OUTPATIENT
Start: 2023-10-23 | End: 2023-11-07

## 2023-10-23 NOTE — TELEPHONE ENCOUNTER
----- Message from Harper Mario MA sent at 10/23/2023  9:05 AM CDT -----  Pt said when she was here last week mrs bassett told her to call if she wasn't better and she might would need different medicine       She says her throat is scratchy but shes a little better but also says the pill for ocugh doesn't work that good. She rathers liquid         [facility]

## 2023-11-06 ENCOUNTER — DOCUMENTATION ONLY (OUTPATIENT)
Dept: FAMILY MEDICINE | Facility: CLINIC | Age: 83
End: 2023-11-06
Payer: MEDICARE

## 2023-11-07 ENCOUNTER — TELEPHONE (OUTPATIENT)
Dept: FAMILY MEDICINE | Facility: CLINIC | Age: 83
End: 2023-11-07

## 2023-11-07 ENCOUNTER — OFFICE VISIT (OUTPATIENT)
Dept: FAMILY MEDICINE | Facility: CLINIC | Age: 83
End: 2023-11-07
Payer: MEDICARE

## 2023-11-07 VITALS
OXYGEN SATURATION: 96 % | WEIGHT: 208 LBS | BODY MASS INDEX: 39.27 KG/M2 | SYSTOLIC BLOOD PRESSURE: 128 MMHG | HEART RATE: 103 BPM | TEMPERATURE: 99 F | HEIGHT: 61 IN | RESPIRATION RATE: 20 BRPM | DIASTOLIC BLOOD PRESSURE: 70 MMHG

## 2023-11-07 DIAGNOSIS — E55.9 VITAMIN D DEFICIENCY: ICD-10-CM

## 2023-11-07 DIAGNOSIS — E78.2 MIXED HYPERLIPIDEMIA: ICD-10-CM

## 2023-11-07 DIAGNOSIS — K21.9 GASTROESOPHAGEAL REFLUX DISEASE, UNSPECIFIED WHETHER ESOPHAGITIS PRESENT: ICD-10-CM

## 2023-11-07 DIAGNOSIS — Z79.899 LONG TERM CURRENT USE OF THERAPEUTIC DRUG: Primary | ICD-10-CM

## 2023-11-07 DIAGNOSIS — Z23 FLU VACCINE NEED: ICD-10-CM

## 2023-11-07 DIAGNOSIS — I10 PRIMARY HYPERTENSION: ICD-10-CM

## 2023-11-07 DIAGNOSIS — R05.9 COUGH, UNSPECIFIED TYPE: ICD-10-CM

## 2023-11-07 DIAGNOSIS — M19.90 ARTHRITIS: ICD-10-CM

## 2023-11-07 DIAGNOSIS — I10 HYPERTENSION, UNSPECIFIED TYPE: ICD-10-CM

## 2023-11-07 DIAGNOSIS — E28.39 DECREASED ESTROGEN LEVEL: ICD-10-CM

## 2023-11-07 DIAGNOSIS — F32.A DEPRESSION, UNSPECIFIED DEPRESSION TYPE: ICD-10-CM

## 2023-11-07 DIAGNOSIS — R73.03 PREDIABETES: ICD-10-CM

## 2023-11-07 DIAGNOSIS — J40 BRONCHITIS: ICD-10-CM

## 2023-11-07 LAB
ALBUMIN SERPL-MCNC: 4.1 G/DL (ref 3.4–5)
ALBUMIN/GLOB SERPL: 1.7 RATIO
ALP SERPL-CCNC: 83 UNIT/L (ref 50–144)
ALT SERPL-CCNC: 27 UNIT/L (ref 1–45)
ANION GAP SERPL CALC-SCNC: 6 MEQ/L (ref 2–13)
AST SERPL-CCNC: 37 UNIT/L (ref 14–36)
BASOPHILS # BLD AUTO: 0.05 X10(3)/MCL (ref 0.01–0.08)
BASOPHILS NFR BLD AUTO: 1 % (ref 0.1–1.2)
BILIRUB SERPL-MCNC: 0.7 MG/DL (ref 0–1)
BUN SERPL-MCNC: 15 MG/DL (ref 7–20)
CALCIUM SERPL-MCNC: 9.9 MG/DL (ref 8.4–10.2)
CHLORIDE SERPL-SCNC: 102 MMOL/L (ref 98–110)
CHOLEST SERPL-MCNC: 156 MG/DL (ref 0–200)
CO2 SERPL-SCNC: 27 MMOL/L (ref 21–32)
CREAT SERPL-MCNC: 0.52 MG/DL (ref 0.66–1.25)
CREAT/UREA NIT SERPL: 29 (ref 12–20)
EOSINOPHIL # BLD AUTO: 0.22 X10(3)/MCL (ref 0.04–0.36)
EOSINOPHIL NFR BLD AUTO: 4.2 % (ref 0.7–7)
ERYTHROCYTE [DISTWIDTH] IN BLOOD BY AUTOMATED COUNT: 13.5 % (ref 11–14.5)
EST. AVERAGE GLUCOSE BLD GHB EST-MCNC: 119.8 MG/DL (ref 70–115)
GFR SERPLBLD CREATININE-BSD FMLA CKD-EPI: >90 MLS/MIN/1.73/M2
GLOBULIN SER-MCNC: 2.4 GM/DL (ref 2–3.9)
GLUCOSE SERPL-MCNC: 121 MG/DL (ref 70–115)
HBA1C MFR BLD: 5.8 % (ref 4–6)
HCT VFR BLD AUTO: 41 % (ref 36–48)
HDLC SERPL-MCNC: 68 MG/DL (ref 40–60)
HGB BLD-MCNC: 13.8 G/DL (ref 11.8–16)
IMM GRANULOCYTES # BLD AUTO: 0.01 X10(3)/MCL (ref 0–0.03)
IMM GRANULOCYTES NFR BLD AUTO: 0.2 % (ref 0–0.5)
LDLC SERPL DIRECT ASSAY-SCNC: 67.1 MG/DL (ref 30–100)
LYMPHOCYTES # BLD AUTO: 1.41 X10(3)/MCL (ref 1.16–3.74)
LYMPHOCYTES NFR BLD AUTO: 26.9 % (ref 20–55)
MCH RBC QN AUTO: 31.3 PG (ref 27–34)
MCHC RBC AUTO-ENTMCNC: 33.7 G/DL (ref 31–37)
MCV RBC AUTO: 93 FL (ref 79–99)
MONOCYTES # BLD AUTO: 0.57 X10(3)/MCL (ref 0.24–0.36)
MONOCYTES NFR BLD AUTO: 10.9 % (ref 4.7–12.5)
NEUTROPHILS # BLD AUTO: 2.99 X10(3)/MCL (ref 1.56–6.13)
NEUTROPHILS NFR BLD AUTO: 56.8 % (ref 37–73)
NRBC BLD AUTO-RTO: 0 %
PLATELET # BLD AUTO: 171 X10(3)/MCL (ref 140–371)
PMV BLD AUTO: 10.8 FL (ref 9.4–12.4)
POTASSIUM SERPL-SCNC: 4.6 MMOL/L (ref 3.5–5.1)
PROT SERPL-MCNC: 6.5 GM/DL (ref 6.3–8.2)
RBC # BLD AUTO: 4.41 X10(6)/MCL (ref 4–5.1)
SODIUM SERPL-SCNC: 135 MMOL/L (ref 135–145)
TRIGL SERPL-MCNC: 71 MG/DL (ref 30–200)
WBC # SPEC AUTO: 5.25 X10(3)/MCL (ref 4–11.5)

## 2023-11-07 PROCEDURE — 90694 VACC AIIV4 NO PRSRV 0.5ML IM: CPT | Mod: ,,, | Performed by: NURSE PRACTITIONER

## 2023-11-07 PROCEDURE — 85025 COMPLETE CBC W/AUTO DIFF WBC: CPT | Performed by: NURSE PRACTITIONER

## 2023-11-07 PROCEDURE — 82306 VITAMIN D 25 HYDROXY: CPT | Performed by: NURSE PRACTITIONER

## 2023-11-07 PROCEDURE — 90694 FLU VACCINE - QUADRIVALENT - ADJUVANTED: ICD-10-PCS | Mod: ,,, | Performed by: NURSE PRACTITIONER

## 2023-11-07 PROCEDURE — G0008 ADMIN INFLUENZA VIRUS VAC: HCPCS | Mod: ,,, | Performed by: NURSE PRACTITIONER

## 2023-11-07 PROCEDURE — G0008 FLU VACCINE - QUADRIVALENT - ADJUVANTED: ICD-10-PCS | Mod: ,,, | Performed by: NURSE PRACTITIONER

## 2023-11-07 PROCEDURE — 83036 HEMOGLOBIN GLYCOSYLATED A1C: CPT | Performed by: NURSE PRACTITIONER

## 2023-11-07 PROCEDURE — 99214 PR OFFICE/OUTPT VISIT, EST, LEVL IV, 30-39 MIN: ICD-10-PCS | Mod: ,,, | Performed by: NURSE PRACTITIONER

## 2023-11-07 PROCEDURE — 80061 LIPID PANEL: CPT | Performed by: NURSE PRACTITIONER

## 2023-11-07 PROCEDURE — 80053 COMPREHEN METABOLIC PANEL: CPT | Performed by: NURSE PRACTITIONER

## 2023-11-07 PROCEDURE — 99214 OFFICE O/P EST MOD 30 MIN: CPT | Mod: ,,, | Performed by: NURSE PRACTITIONER

## 2023-11-07 RX ORDER — CITALOPRAM 40 MG/1
TABLET, FILM COATED ORAL
Qty: 90 TABLET | Refills: 3 | Status: SHIPPED | OUTPATIENT
Start: 2023-11-07

## 2023-11-07 RX ORDER — PANTOPRAZOLE SODIUM 40 MG/1
40 TABLET, DELAYED RELEASE ORAL DAILY PRN
Qty: 70 TABLET | Refills: 0 | Status: SHIPPED | OUTPATIENT
Start: 2023-11-07 | End: 2024-03-13

## 2023-11-07 RX ORDER — AMLODIPINE BESYLATE 10 MG/1
10 TABLET ORAL DAILY
Qty: 90 TABLET | Refills: 1 | Status: SHIPPED | OUTPATIENT
Start: 2023-11-07 | End: 2024-01-30 | Stop reason: SDUPTHER

## 2023-11-07 RX ORDER — ISOSORBIDE MONONITRATE 60 MG/1
60 TABLET, EXTENDED RELEASE ORAL 2 TIMES DAILY
Qty: 60 TABLET | Refills: 2 | Status: SHIPPED | OUTPATIENT
Start: 2023-11-07 | End: 2024-03-13

## 2023-11-07 RX ORDER — LISINOPRIL 10 MG/1
TABLET ORAL
Qty: 180 TABLET | Refills: 1 | Status: SHIPPED | OUTPATIENT
Start: 2023-11-07

## 2023-11-07 NOTE — ASSESSMENT & PLAN NOTE
Currently taking Lipitor 40 p.o. q.h.s. with low cholesterol diet. Will notify with results of lab draw when available. Continue current treatment until results available.

## 2023-11-07 NOTE — ASSESSMENT & PLAN NOTE
Taking vitamin D 1000 daily. Will notify with results of lab draw when available. Continue current treatment until results available.

## 2023-11-07 NOTE — ASSESSMENT & PLAN NOTE
Currently taking amlodipine 10 mg with lisinopril 10 mg daily. The current medical regimen is effective;  continue present plan and medications.

## 2023-11-07 NOTE — PROGRESS NOTES
"Subjective:       Patient ID: Leigh Ann Rubio is a 83 y.o. female.    Chief Complaint: Follow-up (6 mth f/u with fasting labs. Also needing flu vaccine)    Patient returns today for lab draw to recheck chronic conditions including hyperlipidemia and prediabetes.  She has recently experienced URI.       Review of Systems   Constitutional:  Negative for activity change and appetite change.   HENT:  Positive for nasal congestion and postnasal drip. Negative for trouble swallowing and voice change.    Eyes: Negative.  Negative for visual disturbance.   Respiratory: Negative.  Negative for chest tightness, shortness of breath and wheezing.    Cardiovascular:  Negative for chest pain, palpitations and leg swelling.   Endocrine: Negative for cold intolerance, heat intolerance and polyuria.   Genitourinary:  Negative for bladder incontinence, difficulty urinating, flank pain and frequency.   Allergic/Immunologic: Negative for environmental allergies and food allergies.   Neurological:  Negative for vertigo, tremors, seizures, syncope, light-headedness, headaches and coordination difficulties.   Hematological:  Negative for adenopathy. Does not bruise/bleed easily.   Psychiatric/Behavioral:  Negative for agitation, sleep disturbance and suicidal ideas.          Objective:      Vitals:    11/07/23 0700   BP: 128/70   Pulse: 103   Resp: 20   Temp: 98.5 °F (36.9 °C)   SpO2: 96%   Weight: 94.3 kg (208 lb)   Height: 5' 1" (1.549 m)       Physical Exam  Vitals and nursing note reviewed.   Constitutional:       General: She is not in acute distress.     Appearance: She is not ill-appearing.   HENT:      Head: Normocephalic and atraumatic.      Mouth/Throat:      Mouth: Mucous membranes are moist.      Pharynx: Oropharynx is clear.   Eyes:      General: No scleral icterus.     Extraocular Movements: Extraocular movements intact.      Conjunctiva/sclera: Conjunctivae normal.      Pupils: Pupils are equal, round, and reactive to " light.   Neck:      Vascular: No carotid bruit.   Cardiovascular:      Rate and Rhythm: Normal rate and regular rhythm.      Heart sounds: No murmur heard.     No friction rub. No gallop.   Pulmonary:      Effort: Pulmonary effort is normal. No respiratory distress.      Breath sounds: Normal breath sounds. No wheezing, rhonchi or rales.   Abdominal:      General: Abdomen is flat. Bowel sounds are normal. There is no distension.      Palpations: Abdomen is soft. There is no mass.      Tenderness: There is no abdominal tenderness.   Musculoskeletal:         General: Normal range of motion.      Cervical back: Normal range of motion and neck supple.   Skin:     General: Skin is warm and dry.   Neurological:      General: No focal deficit present.      Mental Status: She is alert.   Psychiatric:         Mood and Affect: Mood normal.         Assessment/Plan:     1. Mixed hyperlipidemia  Assessment & Plan:  Currently taking Lipitor 40 p.o. q.h.s. with low cholesterol diet. Will notify with results of lab draw when available. Continue current treatment until results available.         2. Primary hypertension  Assessment & Plan:  Currently taking amlodipine 10 mg with lisinopril 10 mg daily. The current medical regimen is effective;  continue present plan and medications.      Orders:  -     isosorbide mononitrate (IMDUR) 60 MG 24 hr tablet; Take 1 tablet (60 mg total) by mouth 2 (two) times a day.  Dispense: 60 tablet; Refill: 2    3. Prediabetes  Assessment & Plan:  Has been following lower fat diet we will check hemoglobin A1c and notify with results.  Also send copy of results to [facility] provider for continuity records. Will notify with results of lab draw when available. Continue current treatment until results available.         4. Arthritis  Assessment & Plan:  mobic 7/5 mg daily prn, The current medical regimen is effective;  continue present plan and medications.        5. Vitamin D deficiency  Assessment &  Plan:  Taking vitamin D 1000 daily. Will notify with results of lab draw when available. Continue current treatment until results available.         6. Hypertension, unspecified type  -     amLODIPine (NORVASC) 10 MG tablet; Take 1 tablet (10 mg total) by mouth once daily.  Dispense: 90 tablet; Refill: 1  -     lisinopriL 10 MG tablet; TAKE ONE(1) TAB BY MOUTH TWICE DAILY FOR BLOOD PRESSURE  Dispense: 180 tablet; Refill: 1    7. Depression, unspecified depression type  -     citalopram (CELEXA) 40 MG tablet; TAKE ONE(1) TAB BY MOUTH EVERY DAY WITH FOOD FOR DEPRESSION, ANXIETY, &/OR IRRITABILITY  Dispense: 90 tablet; Refill: 3    8. Cough, unspecified type  -     codeine-guaiFENesin 7.5-225 mg/5 mL Liqd; Take 7.5 mLs by mouth 2 (two) times daily as needed (cough).  Dispense: 473 mL; Refill: 0    9. Gastroesophageal reflux disease, unspecified whether esophagitis present  -     pantoprazole (PROTONIX) 40 MG tablet; Take 1 tablet (40 mg total) by mouth daily as needed (reflux).  Dispense: 70 tablet; Refill: 0    10. Bronchitis  Assessment & Plan:  Still with a dry cough lungs clear feeling breathing has improved but not requiring either the Proventil or the Symbicort at this time.  Keep the cough medicine going as needed but use the inhalers if needed and call any changes or problems.         No follow-ups on file.          Discussed the diagnosis, prognosis, plan of care, chronic nature of and indications for, risks and benefits of treatment for conditions.  Continue all medications as prescribed unless otherwise noted.   Call if patient develops other symptoms or if not better in 2-3 days and sooner if worse. Emphasized the importance of compliance with all recommendations, including medication use and timely follow up. Instructed to return as noted be or sooner if patient develops any other problems or if there are any other additional questions or concerns.

## 2023-11-07 NOTE — ASSESSMENT & PLAN NOTE
Has been following lower fat diet we will check hemoglobin A1c and notify with results.  Also send copy of results to [facility] provider for continuity records. Will notify with results of lab draw when available. Continue current treatment until results available.

## 2023-11-07 NOTE — TELEPHONE ENCOUNTER
----- Message from Neena Ryder DNP sent at 11/7/2023  4:49 PM CST -----  Notify kidney function stable hepatic enzyme elevation.  Cholesterol well-controlled continue current medication CBC stable but still in prediabetic range recheck hemoglobin A1c in 1 year.  Refill Lipitor 40 recheck CMP FLP CBC in six-month

## 2023-11-07 NOTE — TELEPHONE ENCOUNTER
----- Message from Blanquita Teixeira sent at 11/7/2023  1:14 PM CST -----  Regarding: Call out med  She was to have cough syrup sent to pharmacy after her visit today but they did not have the script.  Please send to [facility]

## 2023-11-07 NOTE — TELEPHONE ENCOUNTER
Called pharmacy to check what she was on in the past. States that she was on Guaifenesin/Codeine. Was told to refill that.

## 2023-11-07 NOTE — ASSESSMENT & PLAN NOTE
Still with a dry cough lungs clear feeling breathing has improved but not requiring either the Proventil or the Symbicort at this time.  Keep the cough medicine going as needed but use the inhalers if needed and call any changes or problems.

## 2023-11-07 NOTE — ASSESSMENT & PLAN NOTE
mobic 7/5 mg daily prn, The current medical regimen is effective;  continue present plan and medications.

## 2023-11-08 LAB — DEPRECATED CALCIDIOL+CALCIFEROL SERPL-MC: 33.3 NG/ML (ref 30–80)

## 2023-11-21 ENCOUNTER — HOSPITAL ENCOUNTER (OUTPATIENT)
Dept: RADIOLOGY | Facility: HOSPITAL | Age: 83
Discharge: HOME | End: 2023-11-21
Attending: NURSE PRACTITIONER
Payer: MEDICARE

## 2023-11-21 ENCOUNTER — TELEPHONE (OUTPATIENT)
Dept: FAMILY MEDICINE | Facility: CLINIC | Age: 83
End: 2023-11-21
Payer: MEDICARE

## 2023-11-21 DIAGNOSIS — E28.39 DECREASED ESTROGEN LEVEL: ICD-10-CM

## 2023-11-21 PROCEDURE — 77078 CT BONE DENSITY AXIAL: CPT | Mod: TC

## 2023-11-21 NOTE — TELEPHONE ENCOUNTER
----- Message from Neena Ryder DNP sent at 11/21/2023 12:39 PM CST -----  Notify severe osteoprorsis, -4.09 lumbar and -2.89 hip. Begin prolia every 6 months, weight bearing exercise with vitamin D and calcium supplement. Recheck dexa in 2 years. Caution prevention lumbar fractures

## 2023-11-21 NOTE — TELEPHONE ENCOUNTER
Pt notified. Willing to do prolia inj.   PAST MEDICAL HISTORY:  Chronic kidney disease (CKD)     DM (diabetes mellitus)     HTN (hypertension)

## 2023-11-28 ENCOUNTER — OFFICE VISIT (OUTPATIENT)
Dept: FAMILY MEDICINE | Facility: CLINIC | Age: 83
End: 2023-11-28
Payer: MEDICARE

## 2023-11-28 VITALS
TEMPERATURE: 98 F | RESPIRATION RATE: 20 BRPM | DIASTOLIC BLOOD PRESSURE: 62 MMHG | OXYGEN SATURATION: 96 % | WEIGHT: 208 LBS | HEART RATE: 91 BPM | BODY MASS INDEX: 39.27 KG/M2 | HEIGHT: 61 IN | SYSTOLIC BLOOD PRESSURE: 119 MMHG

## 2023-11-28 DIAGNOSIS — E55.9 VITAMIN D DEFICIENCY: ICD-10-CM

## 2023-11-28 DIAGNOSIS — M81.0 AGE-RELATED OSTEOPOROSIS WITHOUT CURRENT PATHOLOGICAL FRACTURE: Primary | ICD-10-CM

## 2023-11-28 DIAGNOSIS — N32.81 OVERACTIVE BLADDER: ICD-10-CM

## 2023-11-28 PROCEDURE — 99213 PR OFFICE/OUTPT VISIT, EST, LEVL III, 20-29 MIN: ICD-10-PCS | Mod: ,,, | Performed by: NURSE PRACTITIONER

## 2023-11-28 PROCEDURE — 99213 OFFICE O/P EST LOW 20 MIN: CPT | Mod: ,,, | Performed by: NURSE PRACTITIONER

## 2023-11-28 RX ORDER — MIRABEGRON 25 MG/1
25 TABLET, FILM COATED, EXTENDED RELEASE ORAL DAILY
Qty: 56 TABLET | Refills: 0 | COMMUNITY
Start: 2023-11-28 | End: 2024-01-30

## 2023-11-28 NOTE — PROGRESS NOTES
"Subjective:       Patient ID: Leigh Ann Rubio is a 83 y.o. female.    Chief Complaint: Follow-up (Discuss prolia inj)    The patient returns for discussion of treatment osteoporosis. Was taking fosamax regularly. No history of fracture. Chest congestion and sinus congestion have resolved. Blood pressure controlled.       Review of Systems   Constitutional:  Negative for activity change, fatigue and unexpected weight change.   HENT:  Positive for rhinorrhea. Negative for nasal congestion.    Respiratory:  Positive for cough. Negative for choking, chest tightness, shortness of breath and wheezing.    Gastrointestinal: Negative.    Genitourinary:  Positive for bladder incontinence, nocturia and urgency. Negative for difficulty urinating, dysuria and enuresis.   Musculoskeletal:  Positive for arthralgias and back pain.   Hematological: Negative.    Psychiatric/Behavioral: Negative.           Objective:      Vitals:    11/28/23 1420   BP: 119/62   Pulse: 91   Resp: 20   Temp: 98 °F (36.7 °C)   SpO2: 96%   Weight: 94.3 kg (208 lb)   Height: 5' 1" (1.549 m)       Physical Exam  Constitutional:       Appearance: Normal appearance.   HENT:      Head: Normocephalic.      Nose: Rhinorrhea present.      Mouth/Throat:      Mouth: Mucous membranes are dry.   Eyes:      Extraocular Movements: Extraocular movements intact.      Conjunctiva/sclera: Conjunctivae normal.   Cardiovascular:      Rate and Rhythm: Normal rate and regular rhythm.      Pulses: Normal pulses.   Pulmonary:      Effort: Pulmonary effort is normal.      Breath sounds: Normal breath sounds.   Abdominal:      General: Bowel sounds are normal.      Palpations: Abdomen is soft.   Musculoskeletal:         General: Normal range of motion.      Cervical back: Normal range of motion.   Skin:     General: Skin is warm and dry.      Capillary Refill: Capillary refill takes less than 2 seconds.   Neurological:      General: No focal deficit present.      Mental Status: " She is alert.   Psychiatric:         Mood and Affect: Mood normal.         Assessment/Plan:     1. Age-related osteoporosis without current pathological fracture  Overview:  Hip -4.09, recommend beginning prolia injection every 6 months. Recheck DEXA in 2 years. Increase weight bearing exercises.     Assessment & Plan:  Hip -4.09, recommend beginning prolia injection every 6 months. Recheck DEXA in 2 years. Increase weight bearing exercises. Recheck bmp in 5 months for calcium.       2. Vitamin D deficiency  Assessment & Plan:  Last 33. Goal to 45 or greater. Increase 2000 daily.       3. Overactive bladder  Assessment & Plan:  Begin myrbetric daily return 2 months.          Follow up in about 2 months (around 1/28/2024).          Discussed the diagnosis, prognosis, plan of care, chronic nature of and indications for, risks and benefits of treatment for conditions.  Continue all medications as prescribed unless otherwise noted.   Call if patient develops other symptoms or if not better in 2-3 days and sooner if worse. Emphasized the importance of compliance with all recommendations, including medication use and timely follow up. Instructed to return as noted be or sooner if patient develops any other problems or if there are any other additional questions or concerns.

## 2023-11-28 NOTE — ASSESSMENT & PLAN NOTE
Hip -4.09, recommend beginning prolia injection every 6 months. Recheck DEXA in 2 years. Increase weight bearing exercises. Recheck bmp in 5 months for calcium.

## 2023-12-07 ENCOUNTER — INFUSION (OUTPATIENT)
Dept: INFUSION THERAPY | Facility: HOSPITAL | Age: 83
End: 2023-12-07
Attending: NURSE PRACTITIONER
Payer: MEDICARE

## 2023-12-07 VITALS
HEART RATE: 95 BPM | RESPIRATION RATE: 18 BRPM | DIASTOLIC BLOOD PRESSURE: 75 MMHG | SYSTOLIC BLOOD PRESSURE: 134 MMHG | TEMPERATURE: 99 F | OXYGEN SATURATION: 95 %

## 2023-12-07 DIAGNOSIS — M81.0 AGE-RELATED OSTEOPOROSIS WITHOUT CURRENT PATHOLOGICAL FRACTURE: Primary | ICD-10-CM

## 2023-12-07 PROCEDURE — 96372 THER/PROPH/DIAG INJ SC/IM: CPT

## 2023-12-07 PROCEDURE — 63600175 PHARM REV CODE 636 W HCPCS: Mod: JZ,JG | Performed by: NURSE PRACTITIONER

## 2023-12-07 RX ADMIN — DENOSUMAB 60 MG: 60 INJECTION SUBCUTANEOUS at 10:12

## 2023-12-07 NOTE — PROGRESS NOTES
PT IN ROOM IN STABLE CONDITION, PROLIA GIVEN TO Right arm. PT TOLERATED WELL. PT STATES THAT THEY HAVE NOT RECEIVED PROLIA IN THE PAST AND WAS INSTRUCTED TO STAY FOR 20 MIN AFTER INJECTION TO BE OBSERVED FOR ANY  SIGNS AND SYMPTOMS OF A REACTION. INSTRUCTED PATIENT TO CALL PCP IF NEEDED.

## 2023-12-20 DIAGNOSIS — E78.2 MIXED HYPERLIPIDEMIA: ICD-10-CM

## 2023-12-20 RX ORDER — ATORVASTATIN CALCIUM 40 MG/1
TABLET, FILM COATED ORAL
Qty: 90 TABLET | Refills: 1 | Status: SHIPPED | OUTPATIENT
Start: 2023-12-20

## 2024-01-30 ENCOUNTER — OFFICE VISIT (OUTPATIENT)
Dept: FAMILY MEDICINE | Facility: CLINIC | Age: 84
End: 2024-01-30
Payer: MEDICARE

## 2024-01-30 VITALS
HEIGHT: 61 IN | BODY MASS INDEX: 38.51 KG/M2 | OXYGEN SATURATION: 97 % | SYSTOLIC BLOOD PRESSURE: 128 MMHG | TEMPERATURE: 98 F | DIASTOLIC BLOOD PRESSURE: 62 MMHG | RESPIRATION RATE: 20 BRPM | WEIGHT: 204 LBS | HEART RATE: 92 BPM

## 2024-01-30 DIAGNOSIS — M62.830 MUSCLE SPASM OF BACK: ICD-10-CM

## 2024-01-30 DIAGNOSIS — N32.81 OVERACTIVE BLADDER: Primary | ICD-10-CM

## 2024-01-30 DIAGNOSIS — K21.9 GASTROESOPHAGEAL REFLUX DISEASE WITHOUT ESOPHAGITIS: ICD-10-CM

## 2024-01-30 DIAGNOSIS — E66.9 CLASS 2 OBESITY WITHOUT SERIOUS COMORBIDITY WITH BODY MASS INDEX (BMI) OF 38.0 TO 38.9 IN ADULT, UNSPECIFIED OBESITY TYPE: ICD-10-CM

## 2024-01-30 DIAGNOSIS — I10 PRIMARY HYPERTENSION: ICD-10-CM

## 2024-01-30 DIAGNOSIS — M62.838 MUSCLE SPASM: ICD-10-CM

## 2024-01-30 PROCEDURE — 99214 OFFICE O/P EST MOD 30 MIN: CPT | Mod: ,,, | Performed by: NURSE PRACTITIONER

## 2024-01-30 RX ORDER — MIRABEGRON 25 MG/1
25 TABLET, FILM COATED, EXTENDED RELEASE ORAL DAILY
Qty: 28 TABLET | Refills: 0 | COMMUNITY
Start: 2024-01-30 | End: 2024-01-30 | Stop reason: SDUPTHER

## 2024-01-30 RX ORDER — MIRABEGRON 25 MG/1
25 TABLET, FILM COATED, EXTENDED RELEASE ORAL DAILY
Qty: 30 TABLET | Refills: 3 | Status: SHIPPED | OUTPATIENT
Start: 2024-01-30 | End: 2024-02-22 | Stop reason: SDUPTHER

## 2024-01-30 RX ORDER — BACLOFEN 10 MG/1
10 TABLET ORAL 2 TIMES DAILY PRN
Qty: 30 TABLET | Refills: 2 | Status: SHIPPED | OUTPATIENT
Start: 2024-01-30

## 2024-01-30 RX ORDER — MIRABEGRON 25 MG/1
25 TABLET, FILM COATED, EXTENDED RELEASE ORAL DAILY
Qty: 30 TABLET | Refills: 3 | COMMUNITY
Start: 2024-01-30 | End: 2024-01-30

## 2024-01-30 RX ORDER — AMLODIPINE BESYLATE 10 MG/1
10 TABLET ORAL DAILY
Qty: 90 TABLET | Refills: 1 | Status: SHIPPED | OUTPATIENT
Start: 2024-01-30

## 2024-01-30 NOTE — ASSESSMENT & PLAN NOTE
Norvasc 10 lisinopril 10 mg. The current medical regimen is effective;  continue present plan and medications.

## 2024-01-30 NOTE — PROGRESS NOTES
"Subjective:       Patient ID: Leigh Ann Rubio is a 84 y.o. female.    Chief Complaint: Follow-up (2 mth f/u on meds.)    Patient returns follow-up with hypertension and osteoporosis with first prolia injection on 12/7/23.  She had no side effects taking her 1st Prolia and is scheduled to take the 2nd 1 in another 5 months.  She also is taking better and finds that she is able to urinate and sleep better at night less waking.  Blood pressure is controlled elevations no sinus congestion still some arthritis but otherwise feels that she is doing quite well.      Review of Systems   Constitutional:  Negative for activity change and appetite change.   HENT:  Negative for nasal congestion, trouble swallowing and voice change.    Eyes: Negative.  Negative for visual disturbance.   Respiratory: Negative.  Negative for chest tightness, shortness of breath and wheezing.    Cardiovascular:  Negative for chest pain, palpitations and leg swelling.   Endocrine: Negative for cold intolerance, heat intolerance and polyuria.   Genitourinary:  Negative for bladder incontinence, difficulty urinating, flank pain and frequency.   Musculoskeletal:  Positive for arthralgias. Negative for gait problem.   Allergic/Immunologic: Positive for environmental allergies. Negative for food allergies.   Neurological:  Negative for vertigo, tremors, seizures, syncope, light-headedness, headaches and coordination difficulties.   Hematological:  Negative for adenopathy. Does not bruise/bleed easily.   Psychiatric/Behavioral:  Negative for agitation, sleep disturbance and suicidal ideas.          Objective:      Vitals:    01/30/24 1326   BP: 128/62   Pulse: 92   Resp: 20   Temp: 98.3 °F (36.8 °C)   SpO2: 97%   Weight: 92.5 kg (204 lb)   Height: 5' 1" (1.549 m)       Physical Exam  Vitals and nursing note reviewed.   Constitutional:       General: She is not in acute distress.     Appearance: She is not ill-appearing.   HENT:      Head: Normocephalic " and atraumatic.      Mouth/Throat:      Mouth: Mucous membranes are moist.      Pharynx: Oropharynx is clear.   Eyes:      General: No scleral icterus.     Extraocular Movements: Extraocular movements intact.      Conjunctiva/sclera: Conjunctivae normal.      Pupils: Pupils are equal, round, and reactive to light.   Neck:      Vascular: No carotid bruit.   Cardiovascular:      Rate and Rhythm: Normal rate and regular rhythm.      Heart sounds: No murmur heard.     No friction rub. No gallop.   Pulmonary:      Effort: Pulmonary effort is normal. No respiratory distress.      Breath sounds: Normal breath sounds. No wheezing, rhonchi or rales.   Abdominal:      General: Abdomen is flat. Bowel sounds are normal. There is no distension.      Palpations: Abdomen is soft. There is no mass.      Tenderness: There is no abdominal tenderness.   Musculoskeletal:         General: Normal range of motion.      Cervical back: Normal range of motion and neck supple.   Skin:     General: Skin is warm and dry.   Neurological:      General: No focal deficit present.      Mental Status: She is alert.   Psychiatric:         Mood and Affect: Mood normal.         Assessment/Plan:     1. Overactive bladder  Assessment & Plan:  Myrbetriq 25 is helping to be able to rest better at night less urinating feeling much more comfortable and no side effects blood pressure is remaining stable.    Orders:  -     mirabegron (MYRBETRIQ) 25 mg Tb24 ER tablet; Take 1 tablet (25 mg total) by mouth once daily.  Dispense: 28 tablet; Refill: 0    2. Hypertension, unspecified type    3. Primary hypertension  Assessment & Plan:  Norvasc 10 lisinopril 10 mg. The current medical regimen is effective;  continue present plan and medications.        4. Class 2 obesity without serious comorbidity with body mass index (BMI) of 38.0 to 38.9 in adult, unspecified obesity type  Assessment & Plan:  Due for prolia in June. No side effects.       5. Gastroesophageal reflux  disease without esophagitis  Assessment & Plan:  Protonix 40 daily if needed for reflux.          No follow-ups on file.          Discussed the diagnosis, prognosis, plan of care, chronic nature of and indications for, risks and benefits of treatment for conditions.  Continue all medications as prescribed unless otherwise noted.   Call if patient develops other symptoms or if not better in 2-3 days and sooner if worse. Emphasized the importance of compliance with all recommendations, including medication use and timely follow up. Instructed to return as noted be or sooner if patient develops any other problems or if there are any other additional questions or concerns.

## 2024-01-30 NOTE — ASSESSMENT & PLAN NOTE
Myrbetriq 25 is helping to be able to rest better at night less urinating feeling much more comfortable and no side effects blood pressure is remaining stable.

## 2024-02-22 DIAGNOSIS — N32.81 OVERACTIVE BLADDER: ICD-10-CM

## 2024-02-22 RX ORDER — MIRABEGRON 25 MG/1
25 TABLET, FILM COATED, EXTENDED RELEASE ORAL DAILY
Qty: 28 TABLET | Refills: 0 | COMMUNITY
Start: 2024-02-22 | End: 2024-03-25 | Stop reason: SDUPTHER

## 2024-03-13 DIAGNOSIS — I10 PRIMARY HYPERTENSION: ICD-10-CM

## 2024-03-13 DIAGNOSIS — K21.9 GASTROESOPHAGEAL REFLUX DISEASE, UNSPECIFIED WHETHER ESOPHAGITIS PRESENT: ICD-10-CM

## 2024-03-13 RX ORDER — PANTOPRAZOLE SODIUM 40 MG/1
TABLET, DELAYED RELEASE ORAL
Qty: 70 TABLET | Refills: 0 | Status: SHIPPED | OUTPATIENT
Start: 2024-03-13 | End: 2024-05-07 | Stop reason: SDUPTHER

## 2024-03-13 RX ORDER — ISOSORBIDE MONONITRATE 60 MG/1
TABLET, EXTENDED RELEASE ORAL
Qty: 60 TABLET | Refills: 2 | Status: SHIPPED | OUTPATIENT
Start: 2024-03-13 | End: 2024-05-14

## 2024-03-25 ENCOUNTER — TELEPHONE (OUTPATIENT)
Dept: FAMILY MEDICINE | Facility: CLINIC | Age: 84
End: 2024-03-25
Payer: MEDICARE

## 2024-03-25 DIAGNOSIS — N32.81 OVERACTIVE BLADDER: ICD-10-CM

## 2024-03-25 RX ORDER — MIRABEGRON 25 MG/1
25 TABLET, FILM COATED, EXTENDED RELEASE ORAL DAILY
Qty: 28 TABLET | Refills: 0 | COMMUNITY
Start: 2024-03-25 | End: 2024-04-18

## 2024-03-25 NOTE — TELEPHONE ENCOUNTER
----- Message from Blanquita Teixeira sent at 3/25/2024 11:32 AM CDT -----  Regarding: Med samples  Needs more samples of Myrbetriq 25mg

## 2024-04-18 DIAGNOSIS — N32.81 OVERACTIVE BLADDER: Primary | ICD-10-CM

## 2024-04-18 RX ORDER — MIRABEGRON 50 MG/1
1 TABLET, FILM COATED, EXTENDED RELEASE ORAL EVERY OTHER DAY
Qty: 56 TABLET | Refills: 0 | COMMUNITY
Start: 2024-04-18 | End: 2024-08-08

## 2024-04-18 RX ORDER — MIRABEGRON 50 MG/1
1 TABLET, FILM COATED, EXTENDED RELEASE ORAL EVERY OTHER DAY
COMMUNITY
End: 2024-04-18

## 2024-05-07 ENCOUNTER — OFFICE VISIT (OUTPATIENT)
Dept: FAMILY MEDICINE | Facility: CLINIC | Age: 84
End: 2024-05-07
Payer: MEDICARE

## 2024-05-07 VITALS
HEIGHT: 61 IN | BODY MASS INDEX: 38.33 KG/M2 | RESPIRATION RATE: 20 BRPM | WEIGHT: 203 LBS | SYSTOLIC BLOOD PRESSURE: 124 MMHG | TEMPERATURE: 98 F | HEART RATE: 87 BPM | OXYGEN SATURATION: 98 % | DIASTOLIC BLOOD PRESSURE: 69 MMHG

## 2024-05-07 DIAGNOSIS — K21.9 GASTROESOPHAGEAL REFLUX DISEASE, UNSPECIFIED WHETHER ESOPHAGITIS PRESENT: ICD-10-CM

## 2024-05-07 DIAGNOSIS — F33.42 RECURRENT MAJOR DEPRESSIVE DISORDER, IN FULL REMISSION: ICD-10-CM

## 2024-05-07 DIAGNOSIS — M19.90 ARTHRITIS: ICD-10-CM

## 2024-05-07 DIAGNOSIS — K21.9 GASTROESOPHAGEAL REFLUX DISEASE WITHOUT ESOPHAGITIS: ICD-10-CM

## 2024-05-07 DIAGNOSIS — I10 PRIMARY HYPERTENSION: ICD-10-CM

## 2024-05-07 DIAGNOSIS — E61.1 IRON DEFICIENCY: Primary | ICD-10-CM

## 2024-05-07 DIAGNOSIS — N32.81 OVERACTIVE BLADDER: ICD-10-CM

## 2024-05-07 DIAGNOSIS — E66.09 CLASS 2 OBESITY DUE TO EXCESS CALORIES WITHOUT SERIOUS COMORBIDITY WITH BODY MASS INDEX (BMI) OF 38.0 TO 38.9 IN ADULT: ICD-10-CM

## 2024-05-07 DIAGNOSIS — J30.2 SEASONAL ALLERGIC RHINITIS, UNSPECIFIED TRIGGER: ICD-10-CM

## 2024-05-07 DIAGNOSIS — E55.9 VITAMIN D DEFICIENCY: ICD-10-CM

## 2024-05-07 DIAGNOSIS — I10 HYPERTENSION, UNSPECIFIED TYPE: ICD-10-CM

## 2024-05-07 DIAGNOSIS — E78.2 MIXED HYPERLIPIDEMIA: ICD-10-CM

## 2024-05-07 PROBLEM — F32.A DEPRESSION: Status: ACTIVE | Noted: 2024-05-07

## 2024-05-07 LAB
ALBUMIN SERPL-MCNC: 4.1 G/DL (ref 3.4–5)
ALBUMIN/GLOB SERPL: 1.7 RATIO
ALP SERPL-CCNC: 82 UNIT/L (ref 50–144)
ALT SERPL-CCNC: 24 UNIT/L (ref 1–45)
ANION GAP SERPL CALC-SCNC: 4 MEQ/L (ref 2–13)
AST SERPL-CCNC: 39 UNIT/L (ref 14–36)
BASOPHILS # BLD AUTO: 0.05 X10(3)/MCL (ref 0.01–0.08)
BASOPHILS NFR BLD AUTO: 1 % (ref 0.1–1.2)
BILIRUB SERPL-MCNC: 0.6 MG/DL (ref 0–1)
BUN SERPL-MCNC: 13 MG/DL (ref 7–20)
CALCIUM SERPL-MCNC: 9.7 MG/DL (ref 8.4–10.2)
CHLORIDE SERPL-SCNC: 104 MMOL/L (ref 98–110)
CHOLEST SERPL-MCNC: 157 MG/DL (ref 0–200)
CO2 SERPL-SCNC: 29 MMOL/L (ref 21–32)
CREAT SERPL-MCNC: 0.67 MG/DL (ref 0.66–1.25)
CREAT/UREA NIT SERPL: 19 (ref 12–20)
EOSINOPHIL # BLD AUTO: 0.27 X10(3)/MCL (ref 0.04–0.36)
EOSINOPHIL NFR BLD AUTO: 5.6 % (ref 0.7–7)
ERYTHROCYTE [DISTWIDTH] IN BLOOD BY AUTOMATED COUNT: 13.9 % (ref 11–14.5)
GFR SERPLBLD CREATININE-BSD FMLA CKD-EPI: 87 MLS/MIN/1.73/M2
GLOBULIN SER-MCNC: 2.4 GM/DL (ref 2–3.9)
GLUCOSE SERPL-MCNC: 114 MG/DL (ref 70–115)
HCT VFR BLD AUTO: 41.8 % (ref 36–48)
HDLC SERPL-MCNC: 71 MG/DL (ref 40–60)
HGB BLD-MCNC: 13.9 G/DL (ref 11.8–16)
IMM GRANULOCYTES # BLD AUTO: 0.01 X10(3)/MCL (ref 0–0.03)
IMM GRANULOCYTES NFR BLD AUTO: 0.2 % (ref 0–0.5)
IRON SERPL-MCNC: 88 UG/DL (ref 37–170)
LDLC SERPL DIRECT ASSAY-SCNC: 68.8 MG/DL (ref 30–100)
LYMPHOCYTES # BLD AUTO: 1.6 X10(3)/MCL (ref 1.16–3.74)
LYMPHOCYTES NFR BLD AUTO: 33.2 % (ref 20–55)
MCH RBC QN AUTO: 30.8 PG (ref 27–34)
MCHC RBC AUTO-ENTMCNC: 33.3 G/DL (ref 31–37)
MCV RBC AUTO: 92.5 FL (ref 79–99)
MONOCYTES # BLD AUTO: 0.42 X10(3)/MCL (ref 0.24–0.36)
MONOCYTES NFR BLD AUTO: 8.7 % (ref 4.7–12.5)
NEUTROPHILS # BLD AUTO: 2.47 X10(3)/MCL (ref 1.56–6.13)
NEUTROPHILS NFR BLD AUTO: 51.3 % (ref 37–73)
NRBC BLD AUTO-RTO: 0 %
PLATELET # BLD AUTO: 151 X10(3)/MCL (ref 140–371)
PMV BLD AUTO: 11 FL (ref 9.4–12.4)
POTASSIUM SERPL-SCNC: 4.4 MMOL/L (ref 3.5–5.1)
PROT SERPL-MCNC: 6.5 GM/DL (ref 6.3–8.2)
RBC # BLD AUTO: 4.52 X10(6)/MCL (ref 4–5.1)
SODIUM SERPL-SCNC: 137 MMOL/L (ref 135–145)
TRIGL SERPL-MCNC: 65 MG/DL (ref 30–200)
TSH SERPL-ACNC: 2.87 UIU/ML (ref 0.36–3.74)
WBC # SPEC AUTO: 4.82 X10(3)/MCL (ref 4–11.5)

## 2024-05-07 PROCEDURE — 80061 LIPID PANEL: CPT | Performed by: NURSE PRACTITIONER

## 2024-05-07 PROCEDURE — 83540 ASSAY OF IRON: CPT | Performed by: NURSE PRACTITIONER

## 2024-05-07 PROCEDURE — 85025 COMPLETE CBC W/AUTO DIFF WBC: CPT | Performed by: NURSE PRACTITIONER

## 2024-05-07 PROCEDURE — 99214 OFFICE O/P EST MOD 30 MIN: CPT | Mod: ,,, | Performed by: NURSE PRACTITIONER

## 2024-05-07 PROCEDURE — 82306 VITAMIN D 25 HYDROXY: CPT | Performed by: NURSE PRACTITIONER

## 2024-05-07 PROCEDURE — 84443 ASSAY THYROID STIM HORMONE: CPT | Performed by: NURSE PRACTITIONER

## 2024-05-07 PROCEDURE — 80053 COMPREHEN METABOLIC PANEL: CPT | Performed by: NURSE PRACTITIONER

## 2024-05-07 RX ORDER — PANTOPRAZOLE SODIUM 40 MG/1
40 TABLET, DELAYED RELEASE ORAL DAILY
Qty: 70 TABLET | Refills: 0 | Status: SHIPPED | OUTPATIENT
Start: 2024-05-07

## 2024-05-07 RX ORDER — MELOXICAM 7.5 MG/1
7.5 TABLET ORAL DAILY
Qty: 60 TABLET | Refills: 2 | Status: SHIPPED | OUTPATIENT
Start: 2024-05-07

## 2024-05-07 RX ORDER — LISINOPRIL 10 MG/1
TABLET ORAL
Qty: 180 TABLET | Refills: 1 | Status: SHIPPED | OUTPATIENT
Start: 2024-05-07

## 2024-05-07 RX ORDER — MIRABEGRON 50 MG/1
50 TABLET, EXTENDED RELEASE ORAL EVERY OTHER DAY
Qty: 14 TABLET | Refills: 0 | COMMUNITY
Start: 2024-05-07

## 2024-05-07 NOTE — ASSESSMENT & PLAN NOTE
Taking mobic 7.5 mg between 0-3 x week and helping to ease symptoms. Doesn't notice more swelling. The current medical regimen is effective;  continue present plan and medications.

## 2024-05-07 NOTE — ASSESSMENT & PLAN NOTE
Celexa 40 mg daily working well for symptoms. The current medical regimen is effective;  continue present plan and medications.

## 2024-05-07 NOTE — ASSESSMENT & PLAN NOTE
Smaller portions. Avoid sweet drinks. The patient's BMI has been recorded in the chart. The patient has been provided educational materials regarding the benefits of attaining and maintaining a normal weight. We will continue to address and follow this issue during follow up visits.

## 2024-05-07 NOTE — ASSESSMENT & PLAN NOTE
Myrbetriq 50 mg qod. Helping to decrease nocturnal urination and less dribbling. The current medical regimen is effective;  continue present plan and medications.

## 2024-05-07 NOTE — ASSESSMENT & PLAN NOTE
Protonix 40 mg daily prn. The current medical regimen is effective;  continue present plan and medications.

## 2024-05-07 NOTE — ASSESSMENT & PLAN NOTE
Lipitor 40 mg qhs, low fat, heart healthy diet. Will notify with results of lab draw when available. Continue current treatment until results available.

## 2024-05-07 NOTE — PROGRESS NOTES
"SUBJECTIVE:  Leigh Ann Rubio is a 84 y.o. female here for Hypertension and Hyperlipidemia      HPI  Pt is in clinic for 6mth f/u. States that all meds have been working well. Needing refills on all meds. States that myrbetriq has been working well and controlling symptoms. Also needing fasting labs. Scheduled for June with Prolia. No side effects or problems with medications.  Has been having increased allergies but no coughing does have at home albuterol and Symbicort if needed but at this point between the Zyrtec and the nasal spray is helping her symptoms.  Blood pressure has been controlled slight swelling to the lower extremities whenever she stands or sits for a prolonged time as it work but otherwise doing well and is here for fasting lab.     Judis allergies, medications, history, and problem list were updated as appropriate.    Review of Systems   HENT:  Positive for postnasal drip and sneezing. Negative for sinus pressure and trouble swallowing.    Eyes: Negative.    Respiratory: Negative.     Cardiovascular:  Positive for leg swelling (with standing).   Endocrine: Negative.    Genitourinary: Negative.    Musculoskeletal:  Positive for arthralgias and back pain.   Hematological: Negative.    Psychiatric/Behavioral: Negative.        A comprehensive review of symptoms was completed and negative except as noted above.    OBJECTIVE:  Vital signs  Vitals:    05/07/24 0724   BP: 124/69   BP Location: Left arm   Patient Position: Sitting   Pulse: 87   Resp: 20   Temp: 98.3 °F (36.8 °C)   SpO2: 98%   Weight: 92.1 kg (203 lb)   Height: 5' 1" (1.549 m)        Physical Exam  Vitals and nursing note reviewed.   Constitutional:       General: She is not in acute distress.     Appearance: She is not ill-appearing.   HENT:      Head: Normocephalic and atraumatic.      Nose: Rhinorrhea present.      Mouth/Throat:      Mouth: Mucous membranes are moist.      Pharynx: Oropharynx is clear.   Eyes:      General: No scleral " icterus.     Extraocular Movements: Extraocular movements intact.      Conjunctiva/sclera: Conjunctivae normal.      Pupils: Pupils are equal, round, and reactive to light.   Neck:      Vascular: No carotid bruit.   Cardiovascular:      Rate and Rhythm: Normal rate and regular rhythm.      Heart sounds: No murmur heard.     No friction rub. No gallop.   Pulmonary:      Effort: Pulmonary effort is normal. No respiratory distress.      Breath sounds: Normal breath sounds. No wheezing, rhonchi or rales.   Abdominal:      General: Abdomen is flat. Bowel sounds are normal. There is no distension.      Palpations: Abdomen is soft. There is no mass.      Tenderness: There is no abdominal tenderness.   Musculoskeletal:         General: Normal range of motion.      Cervical back: Normal range of motion and neck supple.   Skin:     General: Skin is warm and dry.   Neurological:      General: No focal deficit present.      Mental Status: She is alert and oriented to person, place, and time.   Psychiatric:         Mood and Affect: Mood normal.          No visits with results within 1 Day(s) from this visit.   Latest known visit with results is:   Office Visit on 11/07/2023   Component Date Value Ref Range Status    Sodium Level 11/07/2023 135  135 - 145 mmol/L Final    Potassium Level 11/07/2023 4.6  3.5 - 5.1 mmol/L Final    Chloride 11/07/2023 102  98 - 110 mmol/L Final    Carbon Dioxide 11/07/2023 27  21 - 32 mmol/L Final    Glucose Level 11/07/2023 121 (H)  70 - 115 mg/dL Final    Blood Urea Nitrogen 11/07/2023 15.0  7.0 - 20.0 mg/dL Final    Creatinine 11/07/2023 0.52 (L)  0.66 - 1.25 mg/dL Final    Calcium Level Total 11/07/2023 9.9  8.4 - 10.2 mg/dL Final    Protein Total 11/07/2023 6.5  6.3 - 8.2 gm/dL Final    Albumin Level 11/07/2023 4.1  3.4 - 5.0 g/dL Final    Globulin 11/07/2023 2.4  2.0 - 3.9 gm/dL Final    Albumin/Globulin Ratio 11/07/2023 1.7  ratio Final    Bilirubin Total 11/07/2023 0.7  0.0 - 1.0 mg/dL Final     Alkaline Phosphatase 11/07/2023 83  50 - 144 unit/L Final    Alanine Aminotransferase 11/07/2023 27  1 - 45 unit/L Final    Aspartate Aminotransferase 11/07/2023 37 (H)  14 - 36 unit/L Final    eGFR 11/07/2023 >90  mls/min/1.73/m2 Final                         EGFR INTERPRETATION    Beginning 8/15/22 we are reporting the eGFRcr calculation as recommended by the [employer]. The eGFRcr equation has similar overall performance characteristics to the older equation, but the values may differ by more than 10% particularly at higher values of eGFRcr and younger adult ages.    NKF stages of chronic kidney disease (CKD)  Stage 1: Kidney damage with normal or increased eGFR (>90 mL/min/1.73 m^2)  Stage 2: Mild reduction in GFR (60-89 mL/min/1.73 m^2)  Stage 3a: Moderate reduction in GFR (45-59 mL/min/1.73 m^2)  Stage 3b: Moderate reduction in GFR (30-44 mL/min/1.73 m^2)  Stage 4: Severe reduction in GFR (15-29 mL/min/1.73 m^2)  Stage 5: Kidney failure (GFR <15 mL/min/1.73 m^2)        Anion Gap 11/07/2023 6.0  2.0 - 13.0 mEq/L Final    BUN/Creatinine Ratio 11/07/2023 29 (H)  12 - 20 Final    Hemoglobin A1c 11/07/2023 5.8  4.0 - 6.0 % Final    Estimated Average Glucose 11/07/2023 119.8 (H)  70.0 - 115.0 mg/dL Final    Cholesterol Total 11/07/2023 156  0 - 200 mg/dL Final    HDL Cholesterol 11/07/2023 68 (H)  40 - 60 mg/dL Final    Triglyceride 11/07/2023 71  30 - 200 mg/dL Final    LDL Cholesterol Direct 11/07/2023 67.1  30.0 - 100.0 mg/dL Final    Vit D 25 OH 11/07/2023 33.3  30.0 - 80.0 ng/mL Final    WBC 11/07/2023 5.25  4.00 - 11.50 x10(3)/mcL Final    RBC 11/07/2023 4.41  4.00 - 5.10 x10(6)/mcL Final    Hgb 11/07/2023 13.8  11.8 - 16.0 g/dL Final    Hct 11/07/2023 41.0  36.0 - 48.0 % Final    MCV 11/07/2023 93.0  79.0 - 99.0 fL Final    MCH 11/07/2023 31.3  27.0 - 34.0 pg Final    MCHC 11/07/2023 33.7  31.0 - 37.0 g/dL Final    RDW 11/07/2023 13.5  11.0 - 14.5 % Final    Platelet 11/07/2023 171  140 -  371 x10(3)/mcL Final    MPV 11/07/2023 10.8  9.4 - 12.4 fL Final    Neut % 11/07/2023 56.8  37 - 73 % Final    Lymph % 11/07/2023 26.9  20 - 55 % Final    Mono % 11/07/2023 10.9  4.7 - 12.5 % Final    Eos % 11/07/2023 4.2  0.7 - 7 % Final    Basophil % 11/07/2023 1.0  0.1 - 1.2 % Final    Lymph # 11/07/2023 1.41  1.16 - 3.74 x10(3)/mcL Final    Neut # 11/07/2023 2.99  1.56 - 6.13 x10(3)/mcL Final    Mono # 11/07/2023 0.57 (H)  0.24 - 0.36 x10(3)/mcL Final    Eos # 11/07/2023 0.22  0.04 - 0.36 x10(3)/mcL Final    Baso # 11/07/2023 0.05  0.01 - 0.08 x10(3)/mcL Final    IG# 11/07/2023 0.01  0.0001 - 0.031 x10(3)/mcL Final    IG% 11/07/2023 0.2  0 - 0.5 % Final    NRBC% 11/07/2023 0.0  <=1 % Final          ASSESSMENT/PLAN:    1. Iron deficiency  -     Iron    2. Primary hypertension  Assessment & Plan:  Norvasc 10 mg 1 p.o. q.d. lisinopril 10 mg q.d. low-sodium diet. The current medical regimen is effective;  continue present plan and medications.      Orders:  -     CBC Auto Differential  -     Comprehensive Metabolic Panel  -     TSH    3. Mixed hyperlipidemia  Assessment & Plan:  Lipitor 40 mg qhs, low fat, heart healthy diet. Will notify with results of lab draw when available. Continue current treatment until results available.       Orders:  -     Lipid Panel    4. Overactive bladder  Assessment & Plan:  Myrbetriq 50 mg qod. Helping to decrease nocturnal urination and less dribbling. The current medical regimen is effective;  continue present plan and medications.        5. Vitamin D deficiency  Assessment & Plan:  Taking prolia and supplement for osteoporosis. Check vitamin D and Will notify with results of lab draw when available. Continue current treatment until results available.       Orders:  -     Vitamin D    6. Gastroesophageal reflux disease without esophagitis  Assessment & Plan:  Protonix 40 mg daily prn. The current medical regimen is effective;  continue present plan and medications.        7.  Recurrent major depressive disorder, in full remission  Assessment & Plan:  Celexa 40 mg daily working well for symptoms. The current medical regimen is effective;  continue present plan and medications.        8. Seasonal allergic rhinitis, unspecified trigger  Assessment & Plan:  Zyrtec 10 mg daily, astelin nasal spray, one spray bid prn allergies. The current medical regimen is effective;  continue present plan and medications.        9. Class 2 obesity due to excess calories without serious comorbidity with body mass index (BMI) of 38.0 to 38.9 in adult  Assessment & Plan:  Smaller portions. Avoid sweet drinks. The patient's BMI has been recorded in the chart. The patient has been provided educational materials regarding the benefits of attaining and maintaining a normal weight. We will continue to address and follow this issue during follow up visits.       10. Arthritis  Assessment & Plan:  Taking mobic 7.5 mg between 0-3 x week and helping to ease symptoms. Doesn't notice more swelling. The current medical regimen is effective;  continue present plan and medications.      Orders:  -     meloxicam (MOBIC) 7.5 MG tablet; Take 1 tablet (7.5 mg total) by mouth once daily.  Dispense: 60 tablet; Refill: 2    11. Hypertension, unspecified type  -     lisinopriL 10 MG tablet; TAKE ONE(1) TAB BY MOUTH TWICE DAILY FOR BLOOD PRESSURE  Dispense: 180 tablet; Refill: 1    12. Gastroesophageal reflux disease, unspecified whether esophagitis present  -     pantoprazole (PROTONIX) 40 MG tablet; Take 1 tablet (40 mg total) by mouth once daily.  Dispense: 70 tablet; Refill: 0          No results found for this or any previous visit (from the past 24 hour(s)).    Follow Up:  No follow-ups on file.      Discussed the diagnosis, prognosis, plan of care, chronic nature of and indications for, risks and benefits of treatment for conditions.  Continue all medications as prescribed unless otherwise noted.   Call if patient develops  other symptoms or if not better in 2-3 days and sooner if worse. Emphasized the importance of compliance with all recommendations, including medication use and timely follow up. Instructed to return as noted be or sooner if patient develops any other problems or if there are any other additional questions or concerns.

## 2024-05-07 NOTE — ASSESSMENT & PLAN NOTE
Norvasc 10 mg 1 p.o. q.d. lisinopril 10 mg q.d. low-sodium diet. The current medical regimen is effective;  continue present plan and medications.

## 2024-05-07 NOTE — ASSESSMENT & PLAN NOTE
Zyrtec 10 mg daily, astelin nasal spray, one spray bid prn allergies. The current medical regimen is effective;  continue present plan and medications.

## 2024-05-07 NOTE — ASSESSMENT & PLAN NOTE
Taking prolia and supplement for osteoporosis. Check vitamin D and Will notify with results of lab draw when available. Continue current treatment until results available.

## 2024-05-08 ENCOUNTER — TELEPHONE (OUTPATIENT)
Dept: FAMILY MEDICINE | Facility: CLINIC | Age: 84
End: 2024-05-08
Payer: MEDICARE

## 2024-05-08 LAB — DEPRECATED CALCIDIOL+CALCIFEROL SERPL-MC: 34.9 NG/ML (ref 30–80)

## 2024-05-08 NOTE — TELEPHONE ENCOUNTER
----- Message from Neena Ryder DNP sent at 5/8/2024  6:21 AM CDT -----  Notify patient her glucose is slightly improved from last time.  CBC CMP and thyroid therapeutic.  Continue current medication and supplements.  Recheck CMP CBC vitamin-D and hemoglobin A1c in six-month

## 2024-05-14 DIAGNOSIS — I10 PRIMARY HYPERTENSION: ICD-10-CM

## 2024-05-14 RX ORDER — ISOSORBIDE MONONITRATE 60 MG/1
TABLET, EXTENDED RELEASE ORAL
Qty: 60 TABLET | Refills: 2 | Status: SHIPPED | OUTPATIENT
Start: 2024-05-14

## 2024-06-10 ENCOUNTER — INFUSION (OUTPATIENT)
Dept: INFUSION THERAPY | Facility: HOSPITAL | Age: 84
End: 2024-06-10
Attending: NURSE PRACTITIONER
Payer: MEDICARE

## 2024-06-10 VITALS
RESPIRATION RATE: 20 BRPM | SYSTOLIC BLOOD PRESSURE: 142 MMHG | DIASTOLIC BLOOD PRESSURE: 67 MMHG | OXYGEN SATURATION: 97 % | HEART RATE: 84 BPM | TEMPERATURE: 98 F

## 2024-06-10 DIAGNOSIS — M81.0 AGE-RELATED OSTEOPOROSIS WITHOUT CURRENT PATHOLOGICAL FRACTURE: Primary | ICD-10-CM

## 2024-06-10 PROCEDURE — 63600175 PHARM REV CODE 636 W HCPCS: Mod: JZ,JG | Performed by: NURSE PRACTITIONER

## 2024-06-10 PROCEDURE — 96372 THER/PROPH/DIAG INJ SC/IM: CPT

## 2024-06-10 RX ADMIN — DENOSUMAB 60 MG: 60 INJECTION SUBCUTANEOUS at 10:06

## 2024-06-10 NOTE — PLAN OF CARE
Injection given in left upper arm, Patient tolerated it well, No s/s of distress noted, Instructed patient to follow up with her physician as needed, Verbalized understanding, Discharged home in stable condition with family member at her side,    69yo M w pmhx of HTN, s/p L AKA presented from [facility] for fevers. There was concern at the facility that patient has cellulitis of RLE. Patient with no current complaints at this time. ROS neg.  In the ED, T102.3, /60, , RR18 w SpO2 975 on RA. Labs significant for wbc 24.66, Na 129 and lactate 2.9. Burn consulted - exam showed RLE chronic venous statis ulcer w surrounding erythema and edema. Patient s/p vanc, cefepime and flagyl x1;  started on IVF and admitted for RLE cellulitis. Patient was admitted for sepsis 2/2 cellulitis of chronic  stasis ulcers. Patient was evaluated by burn team, did not recommend any surgical debridements. He was evaluated by ID, started on IV antibiotics, blood cultures have been negative, leuckoytosis improved on Hosp day 2. Arterial duplex negative for any PVD. Patient will be discharged back to [facility]  with PO antibiotics and wound care orders.

## 2024-07-10 DIAGNOSIS — M62.830 MUSCLE SPASM OF BACK: ICD-10-CM

## 2024-07-10 RX ORDER — BACLOFEN 10 MG/1
TABLET ORAL
Qty: 30 TABLET | Refills: 2 | Status: SHIPPED | OUTPATIENT
Start: 2024-07-10

## 2024-08-07 ENCOUNTER — OFFICE VISIT (OUTPATIENT)
Dept: FAMILY MEDICINE | Facility: CLINIC | Age: 84
End: 2024-08-07
Payer: MEDICARE

## 2024-08-07 VITALS
BODY MASS INDEX: 38.33 KG/M2 | WEIGHT: 203 LBS | SYSTOLIC BLOOD PRESSURE: 129 MMHG | RESPIRATION RATE: 20 BRPM | HEIGHT: 61 IN | DIASTOLIC BLOOD PRESSURE: 68 MMHG | HEART RATE: 85 BPM | OXYGEN SATURATION: 96 % | TEMPERATURE: 98 F

## 2024-08-07 DIAGNOSIS — M62.830 MUSCLE SPASM OF BACK: ICD-10-CM

## 2024-08-07 DIAGNOSIS — J44.9 CHRONIC OBSTRUCTIVE PULMONARY DISEASE, UNSPECIFIED COPD TYPE: Primary | ICD-10-CM

## 2024-08-07 DIAGNOSIS — K21.9 GASTROESOPHAGEAL REFLUX DISEASE, UNSPECIFIED WHETHER ESOPHAGITIS PRESENT: ICD-10-CM

## 2024-08-07 DIAGNOSIS — N32.81 OVERACTIVE BLADDER: ICD-10-CM

## 2024-08-07 DIAGNOSIS — E66.01 SEVERE OBESITY (BMI 35.0-39.9) WITH COMORBIDITY: ICD-10-CM

## 2024-08-07 DIAGNOSIS — Z85.3 HISTORY OF LEFT BREAST CANCER: ICD-10-CM

## 2024-08-07 DIAGNOSIS — I10 PRIMARY HYPERTENSION: ICD-10-CM

## 2024-08-07 DIAGNOSIS — N32.81 OVERACTIVE BLADDER: Primary | ICD-10-CM

## 2024-08-07 DIAGNOSIS — I10 HYPERTENSION, UNSPECIFIED TYPE: ICD-10-CM

## 2024-08-07 DIAGNOSIS — Z12.31 ENCOUNTER FOR SCREENING MAMMOGRAM FOR MALIGNANT NEOPLASM OF BREAST: ICD-10-CM

## 2024-08-07 PROBLEM — Z12.39 BREAST CANCER SCREENING: Status: ACTIVE | Noted: 2024-08-07

## 2024-08-07 PROCEDURE — 99214 OFFICE O/P EST MOD 30 MIN: CPT | Mod: ,,, | Performed by: NURSE PRACTITIONER

## 2024-08-07 RX ORDER — AMLODIPINE BESYLATE 10 MG/1
10 TABLET ORAL DAILY
Qty: 90 TABLET | Refills: 1 | Status: SHIPPED | OUTPATIENT
Start: 2024-08-07

## 2024-08-07 RX ORDER — LISINOPRIL 10 MG/1
TABLET ORAL
Qty: 180 TABLET | Refills: 1 | Status: SHIPPED | OUTPATIENT
Start: 2024-08-07

## 2024-08-07 RX ORDER — BACLOFEN 10 MG/1
10 TABLET ORAL 2 TIMES DAILY PRN
Qty: 30 TABLET | Refills: 2 | Status: SHIPPED | OUTPATIENT
Start: 2024-08-07

## 2024-08-07 RX ORDER — MIRABEGRON 50 MG/1
50 TABLET, EXTENDED RELEASE ORAL DAILY
Qty: 30 TABLET | Refills: 2 | Status: SHIPPED | OUTPATIENT
Start: 2024-08-07 | End: 2024-11-05

## 2024-08-07 RX ORDER — PANTOPRAZOLE SODIUM 40 MG/1
40 TABLET, DELAYED RELEASE ORAL DAILY
Qty: 70 TABLET | Refills: 0 | Status: SHIPPED | OUTPATIENT
Start: 2024-08-07

## 2024-08-07 RX ORDER — MIRABEGRON 50 MG/1
1 TABLET, EXTENDED RELEASE ORAL EVERY OTHER DAY
COMMUNITY
End: 2024-08-07 | Stop reason: SDUPTHER

## 2024-08-14 ENCOUNTER — HOSPITAL ENCOUNTER (OUTPATIENT)
Dept: RADIOLOGY | Facility: HOSPITAL | Age: 84
Discharge: HOME | End: 2024-08-14
Attending: NURSE PRACTITIONER
Payer: MEDICARE

## 2024-08-14 DIAGNOSIS — Z12.31 ENCOUNTER FOR SCREENING MAMMOGRAM FOR MALIGNANT NEOPLASM OF BREAST: ICD-10-CM

## 2024-08-14 PROCEDURE — 77067 SCR MAMMO BI INCL CAD: CPT | Mod: TC

## 2024-09-10 DIAGNOSIS — I10 PRIMARY HYPERTENSION: ICD-10-CM

## 2024-09-10 RX ORDER — ISOSORBIDE MONONITRATE 60 MG/1
TABLET, EXTENDED RELEASE ORAL
Qty: 60 TABLET | Refills: 2 | Status: SHIPPED | OUTPATIENT
Start: 2024-09-10

## 2024-09-20 DIAGNOSIS — E78.2 MIXED HYPERLIPIDEMIA: ICD-10-CM

## 2024-09-23 ENCOUNTER — HOSPITAL ENCOUNTER (OUTPATIENT)
Dept: RADIOLOGY | Facility: HOSPITAL | Age: 84
Discharge: HOME | End: 2024-09-23
Attending: NURSE PRACTITIONER
Payer: MEDICARE

## 2024-09-23 VITALS — BODY MASS INDEX: 32.96 KG/M2 | WEIGHT: 186 LBS | HEIGHT: 63 IN

## 2024-09-23 DIAGNOSIS — R92.8 ABNORMAL MAMMOGRAM: ICD-10-CM

## 2024-09-23 PROCEDURE — 77062 BREAST TOMOSYNTHESIS BI: CPT | Mod: TC

## 2024-09-23 PROCEDURE — 76642 ULTRASOUND BREAST LIMITED: CPT | Mod: TC,50

## 2024-09-23 PROCEDURE — 77062 BREAST TOMOSYNTHESIS BI: CPT | Mod: 26,,, | Performed by: STUDENT IN AN ORGANIZED HEALTH CARE EDUCATION/TRAINING PROGRAM

## 2024-09-23 PROCEDURE — 77066 DX MAMMO INCL CAD BI: CPT | Mod: 26,,, | Performed by: STUDENT IN AN ORGANIZED HEALTH CARE EDUCATION/TRAINING PROGRAM

## 2024-09-23 PROCEDURE — 76642 ULTRASOUND BREAST LIMITED: CPT | Mod: 26,50,, | Performed by: STUDENT IN AN ORGANIZED HEALTH CARE EDUCATION/TRAINING PROGRAM

## 2024-09-23 RX ORDER — ATORVASTATIN CALCIUM 40 MG/1
TABLET, FILM COATED ORAL
Qty: 90 TABLET | Refills: 1 | Status: SHIPPED | OUTPATIENT
Start: 2024-09-23

## 2024-10-02 ENCOUNTER — HOSPITAL ENCOUNTER (OUTPATIENT)
Dept: RADIOLOGY | Facility: HOSPITAL | Age: 84
Discharge: HOME | End: 2024-10-02
Attending: NURSE PRACTITIONER
Payer: MEDICARE

## 2024-10-02 VITALS — WEIGHT: 198 LBS | BODY MASS INDEX: 37.38 KG/M2 | HEIGHT: 61 IN

## 2024-10-02 DIAGNOSIS — R92.8 ABNORMAL MAMMOGRAM: Primary | ICD-10-CM

## 2024-10-02 DIAGNOSIS — R92.8 ABNORMAL MAMMOGRAM: ICD-10-CM

## 2024-10-02 PROCEDURE — 77061 BREAST TOMOSYNTHESIS UNI: CPT | Mod: TC,LT

## 2024-10-02 PROCEDURE — 19083 BX BREAST 1ST LESION US IMAG: CPT | Mod: LT

## 2024-10-08 ENCOUNTER — TELEPHONE (OUTPATIENT)
Dept: FAMILY MEDICINE | Facility: CLINIC | Age: 84
End: 2024-10-08
Payer: MEDICARE

## 2024-10-08 NOTE — TELEPHONE ENCOUNTER
Received call from Kristian Schafer, nurse navigator, who stated patient was notified by Dr Patel of results. Patient wants to see Neena for referral so appointment made for Monday, Oct 14th.

## 2024-10-08 NOTE — TELEPHONE ENCOUNTER
----- Message from Neena Ryder DNP sent at 10/8/2024 12:24 PM CDT -----  Invasive ductal carcinoma left breast. Does she have a follow up with this? Has she met with surgeon, oncologist? Schedule follow up to discuss.

## 2024-10-14 ENCOUNTER — OFFICE VISIT (OUTPATIENT)
Dept: FAMILY MEDICINE | Facility: CLINIC | Age: 84
End: 2024-10-14
Payer: MEDICARE

## 2024-10-14 VITALS
RESPIRATION RATE: 20 BRPM | HEART RATE: 93 BPM | TEMPERATURE: 98 F | DIASTOLIC BLOOD PRESSURE: 62 MMHG | HEIGHT: 61 IN | OXYGEN SATURATION: 96 % | SYSTOLIC BLOOD PRESSURE: 124 MMHG | BODY MASS INDEX: 38.33 KG/M2 | WEIGHT: 203 LBS

## 2024-10-14 DIAGNOSIS — N32.81 OVERACTIVE BLADDER: ICD-10-CM

## 2024-10-14 DIAGNOSIS — C50.912 INVASIVE DUCTAL CARCINOMA OF BREAST, FEMALE, LEFT: ICD-10-CM

## 2024-10-14 DIAGNOSIS — Z23 INFLUENZA VACCINATION GIVEN: Primary | ICD-10-CM

## 2024-10-14 PROCEDURE — 90653 IIV ADJUVANT VACCINE IM: CPT | Mod: ,,, | Performed by: NURSE PRACTITIONER

## 2024-10-14 PROCEDURE — G0008 ADMIN INFLUENZA VIRUS VAC: HCPCS | Mod: ,,, | Performed by: NURSE PRACTITIONER

## 2024-10-14 PROCEDURE — 99214 OFFICE O/P EST MOD 30 MIN: CPT | Mod: ,,, | Performed by: NURSE PRACTITIONER

## 2024-10-14 RX ORDER — MIRABEGRON 50 MG/1
50 TABLET, EXTENDED RELEASE ORAL DAILY
Qty: 30 TABLET | Refills: 5 | Status: SHIPPED | OUTPATIENT
Start: 2024-10-14

## 2024-10-14 NOTE — PROGRESS NOTES
Patient ID: Leigh Ann Rubio  : 1940     Chief Complaint: mammo results    Allergies: Patient has No Known Allergies.     History of Present Illness:  The patient is a 84 y.o. White female who presents to clinic for evaluation and management with a chief complaint of mammo results   HPI   Patient in clinic with follow-up on mammogram. States that she was told she had tumor to left breast. States that she was told that it is at same place as the one she had to right breast. Cancer center referred her to Dr. Howard. Patient states that otherwise she has been feeling ok. No pain to left breast biopsy site. No sign of infection. Feeling resting well with celexa and feeling resting well. Needs refill myrbetric for bladder.   Past Medical History:  has a past medical history of History of iron deficiency, Invasive ductal carcinoma of breast, female, left, Mixed hyperlipidemia, Obesity, unspecified, Prediabetes, and Vitamin D deficiency.    Social History:  reports that she has never smoked. She has never been exposed to tobacco smoke. She has never used smokeless tobacco. She reports that she does not drink alcohol and does not use drugs.    Care Team: Patient Care Team:  Neena Ryder DNP as PCP - General (Family Medicine)     Current Medications:  Current Outpatient Medications   Medication Instructions    amLODIPine (NORVASC) 10 mg, Oral, Daily    atorvastatin (LIPITOR) 40 MG tablet TAKE ONE(1) TAB BY MOUTH EVERY NIGHT AT BEDTIME FOR CHOLESTEROL    baclofen (LIORESAL) 10 mg, Oral, 2 times daily PRN    citalopram (CELEXA) 40 MG tablet TAKE ONE(1) TAB BY MOUTH EVERY DAY WITH FOOD FOR DEPRESSION, ANXIETY, &/OR IRRITABILITY    isosorbide mononitrate (IMDUR) 60 MG 24 hr tablet TAKE ONE(1) TAB BY MOUTH TWICE DAILY FOR BLOOD PRESSURE &/OR HEART /DO NOT CRUSH OR CHEW    lisinopriL 10 MG tablet TAKE ONE(1) TAB BY MOUTH TWICE DAILY FOR BLOOD PRESSURE    meloxicam (MOBIC) 7.5 mg, Oral, Daily    mirabegron (MYRBETRIQ)  "50 mg, Oral, Daily    pantoprazole (PROTONIX) 40 mg, Oral, Daily       Review of Systems   Constitutional: Negative.    HENT:  Positive for postnasal drip.    Respiratory: Negative.     Cardiovascular: Negative.    Gastrointestinal: Negative.    Genitourinary:  Positive for bladder incontinence (improved with myrbetriq every other day).   Musculoskeletal: Negative.    Hematological: Negative.    Psychiatric/Behavioral:  Negative for decreased concentration, depressed mood, sleep disturbance and suicidal ideas. The patient is nervous/anxious.         Visit Vitals  /62   Pulse 93   Temp 98.4 °F (36.9 °C)   Resp 20   Ht 5' 1" (1.549 m)   Wt 92.1 kg (203 lb)   SpO2 96%   BMI 38.36 kg/m²       Physical Exam  HENT:      Mouth/Throat:      Mouth: Mucous membranes are moist.      Pharynx: Oropharynx is clear.   Cardiovascular:      Rate and Rhythm: Normal rate and regular rhythm.      Pulses: Normal pulses.      Heart sounds: Normal heart sounds.   Pulmonary:      Effort: Pulmonary effort is normal.      Breath sounds: Normal breath sounds.   Chest:          Comments: Scarring and slight retraction scar left breast resolving ecchymosis  Abdominal:      General: Bowel sounds are normal.      Palpations: Abdomen is soft.   Skin:     General: Skin is warm.      Findings: Bruising (left breast) present.          Labs Reviewed:  Chemistry:  Lab Results   Component Value Date     05/07/2024    K 4.4 05/07/2024    BUN 13.0 05/07/2024    CREATININE 0.67 05/07/2024    EGFRNORACEVR 87 05/07/2024    GLUCOSE 114 05/07/2024    CALCIUM 9.7 05/07/2024    ALKPHOS 82 05/07/2024    LABPROT 6.5 05/07/2024    ALBUMIN 4.1 05/07/2024    AST 39 (H) 05/07/2024    ALT 24 05/07/2024    [id number] 34.9 05/07/2024    TSH 2.870 05/07/2024    [id number] 1.20 05/15/2023        Lab Results   Component Value Date    HGBA1C 5.8 11/07/2023        Hematology:  Lab Results   Component Value Date    WBC 4.82 05/07/2024    RBC 4.52 05/07/2024    " HGB 13.9 05/07/2024    HCT 41.8 05/07/2024    MCV 92.5 05/07/2024    MCH 30.8 05/07/2024    MCHC 33.3 05/07/2024    RDW 13.9 05/07/2024     05/07/2024    MPV 11.0 05/07/2024       Lipid Panel:  Lab Results   Component Value Date    CHOL 157 05/07/2024    HDL 71 (H) 05/07/2024    LDLDIRECT 68.8 05/07/2024    TRIG 65 05/07/2024        Assessment & Plan:  1. Influenza vaccination given  Assessment & Plan:  Right deltoid for influenza vaccine for protection.         2. Overactive bladder    3. Invasive ductal carcinoma of breast, female, left  Overview:  History left breast cancer >20 years ago. History resection with radiation treatment and Tomaxifen x 5 years following treatment. Remaining up to date with follow up imaging yearly, but repeat imaging recurrence Invasive Ductal Carcinoma of Breast scar. Discuss findings with patient refer to breast surgeon, Dr Howard and needs oncologist, dr Osuna. Avoid NSAID or mobic for 7 days prior to possible surgery.  Counseling for 25 minutes all questions answered and addressed.            Future Appointments   Date Time Provider Department Center   11/4/2024  7:40 AM NURSE, [facility] D[facility]ILO [place]   11/5/2024  7:40 AM [facility] [place]       No follow-ups on file. Call sooner if needed.    Neena Ryder DNP

## 2024-10-24 DIAGNOSIS — N32.81 OVERACTIVE BLADDER: ICD-10-CM

## 2024-10-24 RX ORDER — MIRABEGRON 50 MG/1
1 TABLET, FILM COATED, EXTENDED RELEASE ORAL
Qty: 30 TABLET | Refills: 2 | Status: SHIPPED | OUTPATIENT
Start: 2024-10-24

## 2024-11-04 PROCEDURE — 82306 VITAMIN D 25 HYDROXY: CPT | Performed by: NURSE PRACTITIONER

## 2024-11-04 PROCEDURE — 80053 COMPREHEN METABOLIC PANEL: CPT | Performed by: NURSE PRACTITIONER

## 2024-11-04 PROCEDURE — 83036 HEMOGLOBIN GLYCOSYLATED A1C: CPT | Performed by: NURSE PRACTITIONER

## 2024-11-04 PROCEDURE — 80061 LIPID PANEL: CPT | Performed by: NURSE PRACTITIONER

## 2024-11-04 PROCEDURE — 85025 COMPLETE CBC W/AUTO DIFF WBC: CPT | Performed by: NURSE PRACTITIONER

## 2024-11-05 ENCOUNTER — OFFICE VISIT (OUTPATIENT)
Dept: FAMILY MEDICINE | Facility: CLINIC | Age: 84
End: 2024-11-05
Payer: MEDICARE

## 2024-11-05 VITALS
HEIGHT: 61 IN | TEMPERATURE: 98 F | OXYGEN SATURATION: 98 % | BODY MASS INDEX: 38.33 KG/M2 | WEIGHT: 203 LBS | HEART RATE: 97 BPM | DIASTOLIC BLOOD PRESSURE: 64 MMHG | RESPIRATION RATE: 20 BRPM | SYSTOLIC BLOOD PRESSURE: 112 MMHG

## 2024-11-05 DIAGNOSIS — J30.2 SEASONAL ALLERGIC RHINITIS, UNSPECIFIED TRIGGER: ICD-10-CM

## 2024-11-05 DIAGNOSIS — I10 HYPERTENSION, UNSPECIFIED TYPE: ICD-10-CM

## 2024-11-05 DIAGNOSIS — Z23 IMMUNIZATION DUE: Primary | ICD-10-CM

## 2024-11-05 DIAGNOSIS — M19.90 ARTHRITIS: ICD-10-CM

## 2024-11-05 DIAGNOSIS — E78.2 MIXED HYPERLIPIDEMIA: ICD-10-CM

## 2024-11-05 DIAGNOSIS — Z01.818 PREOP GENERAL PHYSICAL EXAM: ICD-10-CM

## 2024-11-05 DIAGNOSIS — J44.9 CHRONIC OBSTRUCTIVE PULMONARY DISEASE, UNSPECIFIED COPD TYPE: ICD-10-CM

## 2024-11-05 DIAGNOSIS — S40.812A ABRASION OF LEFT UPPER EXTREMITY, INITIAL ENCOUNTER: ICD-10-CM

## 2024-11-05 DIAGNOSIS — F32.A DEPRESSION, UNSPECIFIED DEPRESSION TYPE: ICD-10-CM

## 2024-11-05 DIAGNOSIS — I10 PRIMARY HYPERTENSION: ICD-10-CM

## 2024-11-05 PROCEDURE — 90714 TD VACC NO PRESV 7 YRS+ IM: CPT | Mod: ,,, | Performed by: NURSE PRACTITIONER

## 2024-11-05 PROCEDURE — 99214 OFFICE O/P EST MOD 30 MIN: CPT | Mod: ,,, | Performed by: NURSE PRACTITIONER

## 2024-11-05 PROCEDURE — 90471 IMMUNIZATION ADMIN: CPT | Mod: ,,, | Performed by: NURSE PRACTITIONER

## 2024-11-05 RX ORDER — ISOSORBIDE MONONITRATE 60 MG/1
60 TABLET, EXTENDED RELEASE ORAL DAILY
Qty: 60 TABLET | Refills: 2 | Status: SHIPPED | OUTPATIENT
Start: 2024-11-05 | End: 2024-11-05

## 2024-11-05 RX ORDER — CITALOPRAM 40 MG/1
TABLET, FILM COATED ORAL
Qty: 90 TABLET | Refills: 3 | Status: SHIPPED | OUTPATIENT
Start: 2024-11-05

## 2024-11-05 RX ORDER — LISINOPRIL 10 MG/1
TABLET ORAL
Qty: 180 TABLET | Refills: 1 | Status: SHIPPED | OUTPATIENT
Start: 2024-11-05

## 2024-11-05 RX ORDER — AZELASTINE 1 MG/ML
1 SPRAY, METERED NASAL 2 TIMES DAILY
Qty: 30 ML | Refills: 1 | Status: SHIPPED | OUTPATIENT
Start: 2024-11-05 | End: 2025-11-05

## 2024-11-05 RX ORDER — ISOSORBIDE MONONITRATE 60 MG/1
60 TABLET, EXTENDED RELEASE ORAL 2 TIMES DAILY
Qty: 60 TABLET | Refills: 2 | Status: SHIPPED | OUTPATIENT
Start: 2024-11-05

## 2024-11-05 RX ORDER — CETIRIZINE HYDROCHLORIDE 10 MG/1
10 TABLET ORAL DAILY
Qty: 90 TABLET | Refills: 1 | Status: SHIPPED | OUTPATIENT
Start: 2024-11-05

## 2024-11-05 RX ORDER — GUAIFENESIN 600 MG/1
600 TABLET, EXTENDED RELEASE ORAL 2 TIMES DAILY
Qty: 24 TABLET | Refills: 1 | Status: SHIPPED | OUTPATIENT
Start: 2024-11-05

## 2024-11-05 RX ORDER — ATORVASTATIN CALCIUM 40 MG/1
40 TABLET, FILM COATED ORAL NIGHTLY
Qty: 90 TABLET | Refills: 1 | Status: SHIPPED | OUTPATIENT
Start: 2024-11-05

## 2024-11-05 NOTE — PROGRESS NOTES
Patient ID: Leigh Ann Rubio  : 1940     Chief Complaint: Follow-up    Allergies: Patient has No Known Allergies.     History of Present Illness:  The patient is a 84 y.o. White female who presents to clinic for evaluation and management with a chief complaint of Follow-up   HPI Patient presents to clinic for follow up to review labs. Patient scheduled with Dr Howard on 2024 for left breast cancer. Knows to hold any NSAID 7 days prior to surgery. Myrbetriq working well for bladder.      Past Medical History:  has a past medical history of History of iron deficiency, Invasive ductal carcinoma of breast, female, left, Mixed hyperlipidemia, Obesity, unspecified, Prediabetes, and Vitamin D deficiency.    Social History:  reports that she has never smoked. She has never been exposed to tobacco smoke. She has never used smokeless tobacco. She reports that she does not drink alcohol and does not use drugs.    Care Team: Patient Care Team:  Neena Ryder DNP as PCP - General (Family Medicine)     Current Medications:  Current Outpatient Medications   Medication Instructions    amLODIPine (NORVASC) 10 mg, Oral, Daily    atorvastatin (LIPITOR) 40 mg, Oral, Nightly    azelastine (ASTELIN) 137 mcg, Nasal, 2 times daily    baclofen (LIORESAL) 10 mg, Oral, 2 times daily PRN    cetirizine (ZYRTEC) 10 mg, Oral, Daily    citalopram (CELEXA) 40 MG tablet TAKE ONE(1) TAB BY MOUTH EVERY DAY WITH FOOD FOR DEPRESSION, ANXIETY, &/OR IRRITABILITY    guaiFENesin (MUCINEX) 600 mg, Oral, 2 times daily    isosorbide mononitrate (IMDUR) 60 mg, Oral, Daily    lisinopriL 10 MG tablet TAKE ONE(1) TAB BY MOUTH TWICE DAILY FOR BLOOD PRESSURE    meloxicam (MOBIC) 7.5 mg, Oral, Daily    MYRBETRIQ 50 mg, Oral    pantoprazole (PROTONIX) 40 mg, Oral, Daily       Review of Systems   Constitutional:  Negative for fatigue, fever and night sweats.   HENT:  Positive for nasal congestion and postnasal drip.    Respiratory: Negative.    "  Cardiovascular:  Positive for leg swelling.   Gastrointestinal: Negative.    Endocrine: Negative.    Genitourinary:  Negative for bladder incontinence, decreased urine volume, difficulty urinating and urgency.   Musculoskeletal:  Positive for arthralgias, back pain and leg pain.   Hematological: Negative.    Psychiatric/Behavioral: Negative.          Visit Vitals  /64 (BP Location: Right arm, Patient Position: Sitting)   Pulse 97   Temp 98 °F (36.7 °C)   Resp 20   Ht 5' 1" (1.549 m)   Wt 92.1 kg (203 lb)   SpO2 98%   BMI 38.36 kg/m²       Physical Exam  HENT:      Right Ear: Tympanic membrane normal.      Left Ear: Tympanic membrane normal.      Nose: Congestion and rhinorrhea present.      Mouth/Throat:      Mouth: Mucous membranes are moist.      Pharynx: Oropharynx is clear.   Cardiovascular:      Rate and Rhythm: Normal rate and regular rhythm.      Pulses: Normal pulses.      Heart sounds: Normal heart sounds.   Pulmonary:      Effort: Pulmonary effort is normal.      Breath sounds: Normal breath sounds.   Chest:          Comments: Scarring and slight retraction scar left breast resolving ecchymosis  Abdominal:      General: Bowel sounds are normal.      Palpations: Abdomen is soft.   Skin:     General: Skin is warm.      Findings: Bruising (left breast) present.   Neurological:      Mental Status: She is oriented to person, place, and time.          Labs Reviewed:  Chemistry:  Lab Results   Component Value Date     11/04/2024    K 4.8 11/04/2024    BUN 15 11/04/2024    CREATININE 0.69 11/04/2024    EGFRNORACEVR 86 11/04/2024    GLUCOSE 112 11/04/2024    CALCIUM 10.2 11/04/2024    ALKPHOS 82 11/04/2024    LABPROT 6.5 11/04/2024    ALBUMIN 4.2 11/04/2024    AST 26 11/04/2024    ALT 20 11/04/2024    [id number] 33 11/04/2024    TSH 2.870 05/07/2024    [id number] 1.20 05/15/2023        Lab Results   Component Value Date    HGBA1C 5.9 11/04/2024        Hematology:  Lab Results   Component Value Date "    WBC 5.10 11/04/2024    RBC 4.33 11/04/2024    HGB 13.4 11/04/2024    HCT 40.3 11/04/2024    MCV 93.1 11/04/2024    MCH 30.9 11/04/2024    MCHC 33.3 11/04/2024    RDW 13.6 11/04/2024     (L) 11/04/2024    MPV 11.1 11/04/2024       Lipid Panel:  Lab Results   Component Value Date    CHOL 134 11/04/2024    HDL 65 (H) 11/04/2024    LDLDIRECT 50.6 11/04/2024    TRIG 64 11/04/2024        Assessment & Plan:  1. Immunization due  Assessment & Plan:  Due for tetanus vaccine booster prior to surgery.       2. Primary hypertension  Assessment & Plan:  Norvasc 10 mg 1 p.o. q.d. lisinopril 10 mg q.d. low-sodium diet. The current medical regimen is effective;  continue present plan and medications.       Orders:  -     isosorbide mononitrate (IMDUR) 60 MG 24 hr tablet; Take 1 tablet (60 mg total) by mouth once daily.  Dispense: 60 tablet; Refill: 2    3. Arthritis  Assessment & Plan:  mobic 7.5 mg between 0-3 x week and helping to ease symptoms. Hold 7 days before surgery-Doesn't notice more swelling. The current medical regimen is effective;  continue present plan and medications.       4. Preop general physical exam  Assessment & Plan:  Needs tetanus booster before surgery on the 14th we will give that today hold any NSAIDs including aspirin or meloxicam 7 days prior to surgery.  No sign of congestion or past problems with surgery to limit ability to have surgery call any problems or calcium is elevated to 10.2 sufficient to take Prolia next month.      5. Mixed hyperlipidemia  Assessment & Plan:  Lipitor 40 mg qhs, low fat, heart healthy diet. Will notify with results of lab draw when available. Continue current treatment until results available.        Orders:  -     atorvastatin (LIPITOR) 40 MG tablet; Take 1 tablet (40 mg total) by mouth every evening.  Dispense: 90 tablet; Refill: 1    6. Seasonal allergic rhinitis, unspecified trigger  Assessment & Plan:  Zyrtec 10 mg daily, astelin nasal spray, one spray bid  prn allergies. The current medical regimen is effective;  continue present plan and medications.       Orders:  -     cetirizine (ZYRTEC) 10 MG tablet; Take 1 tablet (10 mg total) by mouth once daily.  Dispense: 90 tablet; Refill: 1  -     azelastine (ASTELIN) 137 mcg (0.1 %) nasal spray; 1 spray (137 mcg total) by Nasal route 2 (two) times daily.  Dispense: 30 mL; Refill: 1    7. Chronic obstructive pulmonary disease, unspecified COPD type  Assessment & Plan:  Patient only symptomatic during infections with upper respiratory. Denies any current chest congestion, SOB, PND, cough. Declines chest xray or pulmonary function study at this time. Instructed to notify at first sign of any changes. Mucinex if needed for chest congestion    Orders:  -     guaiFENesin (MUCINEX) 600 mg 12 hr tablet; Take 1 tablet (600 mg total) by mouth 2 (two) times daily.  Dispense: 24 tablet; Refill: 1    8. Hypertension, unspecified type  -     lisinopriL 10 MG tablet; TAKE ONE(1) TAB BY MOUTH TWICE DAILY FOR BLOOD PRESSURE  Dispense: 180 tablet; Refill: 1    9. Depression, unspecified depression type  -     citalopram (CELEXA) 40 MG tablet; TAKE ONE(1) TAB BY MOUTH EVERY DAY WITH FOOD FOR DEPRESSION, ANXIETY, &/OR IRRITABILITY  Dispense: 90 tablet; Refill: 3         No future appointments.      Follow up in about 3 months (around 2/5/2025). Call sooner if needed.    Neena Ryder, DNP

## 2024-11-05 NOTE — ASSESSMENT & PLAN NOTE
Needs tetanus booster before surgery on the 14th we will give that today hold any NSAIDs including aspirin or meloxicam 7 days prior to surgery.  No sign of congestion or past problems with surgery to limit ability to have surgery call any problems or calcium is elevated to 10.2 sufficient to take Prolia next month.

## 2024-11-05 NOTE — ASSESSMENT & PLAN NOTE
Patient only symptomatic during infections with upper respiratory. Denies any current chest congestion, SOB, PND, cough. Declines chest xray or pulmonary function study at this time. Instructed to notify at first sign of any changes. Mucinex if needed for chest congestion

## 2024-11-05 NOTE — ASSESSMENT & PLAN NOTE
mobic 7.5 mg between 0-3 x week and helping to ease symptoms. Hold 7 days before surgery-Doesn't notice more swelling. The current medical regimen is effective;  continue present plan and medications.

## 2024-11-06 ENCOUNTER — TELEPHONE (OUTPATIENT)
Dept: FAMILY MEDICINE | Facility: CLINIC | Age: 84
End: 2024-11-06
Payer: MEDICARE

## 2024-11-06 NOTE — TELEPHONE ENCOUNTER
----- Message from Neena Ryder DNP sent at 11/5/2024  5:22 PM CST -----  Notify well-controlled cholesterol normal CMP no diabetes only in prediabetes range and no anemia send copy to Dr. Howard for upcoming surgery and recheck in six-month same labs

## 2024-11-07 ENCOUNTER — TELEPHONE (OUTPATIENT)
Dept: FAMILY MEDICINE | Facility: CLINIC | Age: 84
End: 2024-11-07
Payer: MEDICARE

## 2024-11-07 DIAGNOSIS — R05.9 COUGH, UNSPECIFIED TYPE: Primary | ICD-10-CM

## 2024-11-07 RX ORDER — BENZONATATE 200 MG/1
200 CAPSULE ORAL 3 TIMES DAILY PRN
Qty: 30 CAPSULE | Refills: 0 | Status: SHIPPED | OUTPATIENT
Start: 2024-11-07 | End: 2024-11-17

## 2024-11-07 NOTE — TELEPHONE ENCOUNTER
----- Message from Blanquita sent at 11/7/2024  2:45 PM CST -----  Regarding: Call out meds  She wants some cough meds called out.  OTC not working     [address]

## 2024-11-08 DIAGNOSIS — M62.830 MUSCLE SPASM OF BACK: ICD-10-CM

## 2024-11-11 RX ORDER — BACLOFEN 10 MG/1
TABLET ORAL
Qty: 30 TABLET | Refills: 2 | Status: SHIPPED | OUTPATIENT
Start: 2024-11-11

## 2024-11-21 DIAGNOSIS — K21.9 GASTROESOPHAGEAL REFLUX DISEASE, UNSPECIFIED WHETHER ESOPHAGITIS PRESENT: ICD-10-CM

## 2024-11-21 RX ORDER — PANTOPRAZOLE SODIUM 40 MG/1
TABLET, DELAYED RELEASE ORAL
Qty: 70 TABLET | Refills: 0 | Status: SHIPPED | OUTPATIENT
Start: 2024-11-21

## 2024-11-27 ENCOUNTER — TELEPHONE (OUTPATIENT)
Dept: FAMILY MEDICINE | Facility: CLINIC | Age: 84
End: 2024-11-27
Payer: MEDICARE

## 2024-11-27 DIAGNOSIS — M81.0 AGE-RELATED OSTEOPOROSIS WITHOUT CURRENT PATHOLOGICAL FRACTURE: Primary | ICD-10-CM

## 2024-11-27 NOTE — TELEPHONE ENCOUNTER
----- Message from Oriana sent at 11/27/2024  9:45 AM CST -----  Tawana with the infusion center in [place] called stating Patient has an appointment on 12/12/24 for her Prolia and will need a new order faxed to them.  Fax number is [phone]

## 2024-12-12 ENCOUNTER — INFUSION (OUTPATIENT)
Facility: HOSPITAL | Age: 84
End: 2024-12-12
Attending: NURSE PRACTITIONER
Payer: MEDICARE

## 2024-12-12 VITALS
HEART RATE: 97 BPM | OXYGEN SATURATION: 98 % | BODY MASS INDEX: 38.51 KG/M2 | DIASTOLIC BLOOD PRESSURE: 79 MMHG | WEIGHT: 204 LBS | TEMPERATURE: 98 F | HEIGHT: 61 IN | SYSTOLIC BLOOD PRESSURE: 152 MMHG | RESPIRATION RATE: 20 BRPM

## 2024-12-12 DIAGNOSIS — M81.0 AGE-RELATED OSTEOPOROSIS WITHOUT CURRENT PATHOLOGICAL FRACTURE: Primary | ICD-10-CM

## 2024-12-12 PROCEDURE — 63600175 PHARM REV CODE 636 W HCPCS: Mod: JZ | Performed by: NURSE PRACTITIONER

## 2024-12-12 PROCEDURE — 96372 THER/PROPH/DIAG INJ SC/IM: CPT

## 2024-12-12 RX ADMIN — DENOSUMAB 60 MG: 60 INJECTION SUBCUTANEOUS at 10:12

## 2024-12-12 NOTE — NURSING
Pt received prolia. Pt tolerated well. Pt discharged in stable condition. Pt educated on no dental work 3 months prior or 3 months after injection. Pt also educated on the importance of taking calcium and vit d supplements. Pt states understanding at this time.

## 2025-01-27 PROCEDURE — 80053 COMPREHEN METABOLIC PANEL: CPT | Performed by: NURSE PRACTITIONER

## 2025-01-27 PROCEDURE — 85025 COMPLETE CBC W/AUTO DIFF WBC: CPT | Performed by: NURSE PRACTITIONER

## 2025-01-29 ENCOUNTER — OFFICE VISIT (OUTPATIENT)
Dept: FAMILY MEDICINE | Facility: CLINIC | Age: 85
End: 2025-01-29
Payer: MEDICARE

## 2025-01-29 VITALS
HEART RATE: 100 BPM | BODY MASS INDEX: 36.25 KG/M2 | OXYGEN SATURATION: 97 % | RESPIRATION RATE: 20 BRPM | TEMPERATURE: 98 F | HEIGHT: 61 IN | WEIGHT: 192 LBS | SYSTOLIC BLOOD PRESSURE: 119 MMHG | DIASTOLIC BLOOD PRESSURE: 68 MMHG

## 2025-01-29 DIAGNOSIS — I10 PRIMARY HYPERTENSION: ICD-10-CM

## 2025-01-29 DIAGNOSIS — J30.2 SEASONAL ALLERGIC RHINITIS, UNSPECIFIED TRIGGER: ICD-10-CM

## 2025-01-29 DIAGNOSIS — E66.01 SEVERE OBESITY (BMI 35.0-39.9) WITH COMORBIDITY: ICD-10-CM

## 2025-01-29 DIAGNOSIS — F32.A DEPRESSION, UNSPECIFIED DEPRESSION TYPE: ICD-10-CM

## 2025-01-29 DIAGNOSIS — J44.9 CHRONIC OBSTRUCTIVE PULMONARY DISEASE, UNSPECIFIED COPD TYPE: Primary | ICD-10-CM

## 2025-01-29 DIAGNOSIS — F33.42 RECURRENT MAJOR DEPRESSIVE DISORDER, IN FULL REMISSION: ICD-10-CM

## 2025-01-29 DIAGNOSIS — K21.9 GASTROESOPHAGEAL REFLUX DISEASE, UNSPECIFIED WHETHER ESOPHAGITIS PRESENT: ICD-10-CM

## 2025-01-29 DIAGNOSIS — N32.81 OVERACTIVE BLADDER: ICD-10-CM

## 2025-01-29 DIAGNOSIS — M62.830 MUSCLE SPASM OF BACK: ICD-10-CM

## 2025-01-29 PROCEDURE — 99214 OFFICE O/P EST MOD 30 MIN: CPT | Mod: ,,, | Performed by: NURSE PRACTITIONER

## 2025-01-29 RX ORDER — BACLOFEN 10 MG/1
10 TABLET ORAL DAILY PRN
Qty: 30 TABLET | Refills: 2 | Status: SHIPPED | OUTPATIENT
Start: 2025-01-29

## 2025-01-29 RX ORDER — AMLODIPINE BESYLATE 10 MG/1
10 TABLET ORAL DAILY
Qty: 90 TABLET | Refills: 1 | Status: SHIPPED | OUTPATIENT
Start: 2025-01-29

## 2025-01-29 RX ORDER — PANTOPRAZOLE SODIUM 40 MG/1
40 TABLET, DELAYED RELEASE ORAL DAILY PRN
Qty: 70 TABLET | Refills: 0 | Status: SHIPPED | OUTPATIENT
Start: 2025-01-29

## 2025-01-29 RX ORDER — CITALOPRAM 40 MG/1
TABLET, FILM COATED ORAL
Qty: 90 TABLET | Refills: 3 | Status: SHIPPED | OUTPATIENT
Start: 2025-01-29

## 2025-01-29 RX ORDER — MIRABEGRON 50 MG/1
1 TABLET, FILM COATED, EXTENDED RELEASE ORAL DAILY
Qty: 30 TABLET | Refills: 2 | Status: SHIPPED | OUTPATIENT
Start: 2025-01-29 | End: 2025-01-30 | Stop reason: SDUPTHER

## 2025-01-29 NOTE — ASSESSMENT & PLAN NOTE
Low fat, low carb heart healthy diet with increased activity. The patient's BMI has been recorded in the chart. The patient has been provided educational materials regarding the benefits of attaining and maintaining a normal weight. We will continue to address and follow this issue during follow up visits.

## 2025-01-29 NOTE — ASSESSMENT & PLAN NOTE
Myrbetriq 50 mg qod. Helping to decrease nocturnal urination and less dribbling.  Some mornings more early am symptoms than others.

## 2025-01-29 NOTE — PROGRESS NOTES
Patient ID: Leigh Ann Rubio  : 1940     Chief Complaint: Follow-up (Chronic conditions)    Allergies: Patient has No Known Allergies.     History of Present Illness:  The patient is a 85 y.o. White female who presents to clinic for evaluation and management with a chief complaint of Follow-up (Chronic conditions)   Follow-up  Associated symptoms include congestion and coughing. Pertinent negatives include no chills, fatigue or fever.    is here for follow-up with her chronic conditions she has recently labs drawn for her oncologist Dr. Osuna and we will be seeing him on Friday.  Her breast mastectomy of the left was done on  and she has had no problems with surgical site and she no longer has home but feels that he is doing well.  She is going to see her surgeon again in 2025.  Does have more sinus drip that has started having nighttime cough dry but it is not affecting her breathing at this time.    Past Medical History:  has a past medical history of History of iron deficiency, Invasive ductal carcinoma of breast, female, left, Mixed hyperlipidemia, Obesity, unspecified, Prediabetes, and Vitamin D deficiency.    Social History:  reports that she has never smoked. She has never been exposed to tobacco smoke. She has never used smokeless tobacco. She reports that she does not drink alcohol and does not use drugs.    Care Team: Patient Care Team:  Neena Ryder DNP as PCP - General (Family Medicine)  Jillian Howard MD as Consulting Physician (General Surgery)     Current Medications:  Current Outpatient Medications   Medication Instructions    amLODIPine (NORVASC) 10 mg, Oral, Daily    atorvastatin (LIPITOR) 40 mg, Oral, Nightly    azelastine (ASTELIN) 137 mcg, Nasal, 2 times daily    baclofen (LIORESAL) 10 mg, Oral, Daily PRN    cetirizine (ZYRTEC) 10 mg, Oral, Daily    citalopram (CELEXA) 40 MG tablet TAKE ONE(1) TAB BY MOUTH EVERY DAY WITH FOOD FOR DEPRESSION, ANXIETY,  "&/OR IRRITABILITY    codeine-guaiFENesin 7.5-225 mg/5 mL Liqd 7.5 mLs, Oral, 2 times daily PRN    guaiFENesin (MUCINEX) 600 mg, Oral, 2 times daily    isosorbide mononitrate (IMDUR) 60 mg, Oral, 2 times daily    lisinopriL 10 MG tablet TAKE ONE(1) TAB BY MOUTH TWICE DAILY FOR BLOOD PRESSURE    meloxicam (MOBIC) 7.5 mg, Oral, Daily    mirabegron (MYRBETRIQ) 50 mg, Oral, Daily    pantoprazole (PROTONIX) 40 mg, Oral, Daily PRN       Review of Systems   Constitutional:  Negative for chills, fatigue and fever.   HENT:  Positive for nasal congestion and postnasal drip.    Respiratory:  Positive for cough. Negative for chest tightness, shortness of breath and wheezing.    Cardiovascular: Negative.    Gastrointestinal: Negative.    Genitourinary:  Positive for frequency and nocturia. Negative for bladder incontinence, enuresis and urgency.   Neurological: Negative.    Hematological: Negative.    Psychiatric/Behavioral: Negative.          Visit Vitals  /68 (BP Location: Left arm, Patient Position: Sitting)   Pulse 100   Temp 97.7 °F (36.5 °C)   Resp 20   Ht 5' 1" (1.549 m)   Wt 87.1 kg (192 lb)   SpO2 97%   BMI 36.28 kg/m²       Physical Exam  Constitutional:       Appearance: Normal appearance.   HENT:      Head: Normocephalic.      Nose: Congestion and rhinorrhea present.      Mouth/Throat:      Mouth: Mucous membranes are dry.      Pharynx: Pharyngeal swelling present. No oropharyngeal exudate.   Eyes:      Extraocular Movements: Extraocular movements intact.      Conjunctiva/sclera: Conjunctivae normal.   Cardiovascular:      Rate and Rhythm: Normal rate and regular rhythm.      Pulses: Normal pulses.      Heart sounds: Normal heart sounds.   Pulmonary:      Effort: Pulmonary effort is normal.      Breath sounds: Normal breath sounds.   Chest:          Comments: Well healing surgical incision without inflammation or tenderness left chest wall.   Abdominal:      General: Bowel sounds are normal.      Palpations: " Abdomen is soft.   Musculoskeletal:         General: Normal range of motion.      Cervical back: Normal range of motion.   Skin:     General: Skin is warm and dry.      Capillary Refill: Capillary refill takes less than 2 seconds.   Neurological:      General: No focal deficit present.      Mental Status: She is alert.   Psychiatric:         Mood and Affect: Mood normal.          Labs Reviewed:  Chemistry:  Lab Results   Component Value Date     (L) 01/27/2025    K 4.2 01/27/2025    BUN 16 01/27/2025    CREATININE 0.69 01/27/2025    EGFRNORACEVR 86 01/27/2025    GLUCOSE 108 01/27/2025    CALCIUM 10.1 01/27/2025    ALKPHOS 91 01/27/2025    LABPROT 6.4 01/27/2025    ALBUMIN 4.1 01/27/2025    AST 26 01/27/2025    ALT 18 01/27/2025    [id number] 33 11/04/2024    TSH 2.870 05/07/2024    [id number] 1.20 05/15/2023        Lab Results   Component Value Date    HGBA1C 5.9 11/04/2024        Hematology:  Lab Results   Component Value Date    WBC 5.53 01/27/2025    RBC 4.27 01/27/2025    HGB 13.2 01/27/2025    HCT 38.4 01/27/2025    MCV 89.9 01/27/2025    MCH 30.9 01/27/2025    MCHC 34.4 01/27/2025    RDW 13.0 01/27/2025     01/27/2025    MPV 10.2 01/27/2025       Lipid Panel:  Lab Results   Component Value Date    CHOL 134 11/04/2024    HDL 65 (H) 11/04/2024    LDLDIRECT 50.6 11/04/2024    TRIG 64 11/04/2024        Assessment & Plan:  1. Chronic obstructive pulmonary disease, unspecified COPD type  Assessment & Plan:  Patient only symptomatic during infections with upper respiratory. Denies any current chest congestion, SOB, PND, cough. Declines chest xray or pulmonary function study at this time.increased nasal drip with dry cough keep awake at night. Requesting medication to help rest. Mar cof 7.5 ml qhs prn.     Orders:  -     codeine-guaiFENesin 7.5-225 mg/5 mL Liqd; Take 7.5 mLs by mouth 2 (two) times daily as needed (cough).  Dispense: 90 mL; Refill: 0    2. Seasonal allergic rhinitis, unspecified  trigger  Assessment & Plan:  Zyrtec 10 mg daily, astelin nasal spray, one spray bid prn allergies. The current medical regimen is effective; continue present plan and medications.     Orders:  -     codeine-guaiFENesin 7.5-225 mg/5 mL Liqd; Take 7.5 mLs by mouth 2 (two) times daily as needed (cough).  Dispense: 90 mL; Refill: 0    3. Recurrent major depressive disorder, in full remission  Assessment & Plan:  Celexa 40 mg daily working well for symptoms. The current medical regimen is effective;  continue present plan and medications.       4. Primary hypertension  Assessment & Plan:  Norvasc 10 mg 1 p.o. q.d. lisinopril 10 mg q.d. low-sodium diet. The current medical regimen is effective; continue present plan and medications.     Orders:  -     amLODIPine (NORVASC) 10 MG tablet; Take 1 tablet (10 mg total) by mouth once daily.  Dispense: 90 tablet; Refill: 1    5. Depression, unspecified depression type  Assessment & Plan:  Celexa 40 mg daily working well for symptoms. The current medical regimen is effective;  continue present plan and medications.     Orders:  -     citalopram (CELEXA) 40 MG tablet; TAKE ONE(1) TAB BY MOUTH EVERY DAY WITH FOOD FOR DEPRESSION, ANXIETY, &/OR IRRITABILITY  Dispense: 90 tablet; Refill: 3    6. Muscle spasm of back  Assessment & Plan:  Baclofen 10 mg prn bid muscle spasm. Stretching exercises. Not taking daily but helps when more sorenss especially at night.     Orders:  -     baclofen (LIORESAL) 10 MG tablet; Take 1 tablet (10 mg total) by mouth daily as needed (muscle spasm).  Dispense: 30 tablet; Refill: 2    7. Severe obesity (BMI 35.0-39.9) with comorbidity  Assessment & Plan:  Low fat, low carb heart healthy diet with increased activity. The patient's BMI has been recorded in the chart. The patient has been provided educational materials regarding the benefits of attaining and maintaining a normal weight. We will continue to address and follow this issue during follow up visits.        8. Overactive bladder  Assessment & Plan:  Myrbetriq 50 mg qod. Helping to decrease nocturnal urination and less dribbling.  Some mornings more early am symptoms than others.     Orders:  -     mirabegron (MYRBETRIQ) 50 mg Tb24; Take 1 tablet (50 mg total) by mouth once daily.  Dispense: 30 tablet; Refill: 2    9. Gastroesophageal reflux disease, unspecified whether esophagitis present  -     pantoprazole (PROTONIX) 40 MG tablet; Take 1 tablet (40 mg total) by mouth daily as needed.  Dispense: 70 tablet; Refill: 0         Future Appointments   Date Time Provider Department Center   5/5/2025  7:55 AM [facility]   6/16/2025 10:00 AM CHAIR 02, [facility] CHEMOTHERAPY INFUSION [facility] CHEMO [employer] [place]       Follow up in about 3 months (around 4/29/2025). Call sooner if needed.    Neena Ryder, DNP

## 2025-01-29 NOTE — ASSESSMENT & PLAN NOTE
Patient only symptomatic during infections with upper respiratory. Denies any current chest congestion, SOB, PND, cough. Declines chest xray or pulmonary function study at this time.increased nasal drip with dry cough keep awake at night. Requesting medication to help rest. Mar cof 7.5 ml qhs prn.

## 2025-01-29 NOTE — ASSESSMENT & PLAN NOTE
Baclofen 10 mg prn bid muscle spasm. Stretching exercises. Not taking daily but helps when more sorenss especially at night.

## 2025-01-30 DIAGNOSIS — N32.81 OVERACTIVE BLADDER: ICD-10-CM

## 2025-01-30 RX ORDER — MIRABEGRON 50 MG/1
1 TABLET, FILM COATED, EXTENDED RELEASE ORAL DAILY
Qty: 30 TABLET | Refills: 2 | Status: SHIPPED | OUTPATIENT
Start: 2025-01-30 | End: 2025-02-03 | Stop reason: SDUPTHER

## 2025-02-03 ENCOUNTER — TELEPHONE (OUTPATIENT)
Dept: FAMILY MEDICINE | Facility: CLINIC | Age: 85
End: 2025-02-03

## 2025-02-03 DIAGNOSIS — N32.81 OVERACTIVE BLADDER: ICD-10-CM

## 2025-02-03 RX ORDER — MIRABEGRON 50 MG/1
1 TABLET, FILM COATED, EXTENDED RELEASE ORAL DAILY
Qty: 30 TABLET | Refills: 2 | Status: SHIPPED | OUTPATIENT
Start: 2025-02-03 | End: 2025-02-10 | Stop reason: SDUPTHER

## 2025-02-03 NOTE — TELEPHONE ENCOUNTER
----- Message from Blanquita sent at 2/3/2025  4:04 PM CST -----  Regarding: Resend me  Needs Myrbetric 50mg sent to  [facility] in [place]

## 2025-02-10 ENCOUNTER — TELEPHONE (OUTPATIENT)
Dept: FAMILY MEDICINE | Facility: CLINIC | Age: 85
End: 2025-02-10
Payer: MEDICARE

## 2025-02-10 DIAGNOSIS — N32.81 OVERACTIVE BLADDER: ICD-10-CM

## 2025-02-10 RX ORDER — MIRABEGRON 50 MG/1
1 TABLET, FILM COATED, EXTENDED RELEASE ORAL DAILY
Qty: 30 TABLET | Refills: 2 | Status: SHIPPED | OUTPATIENT
Start: 2025-02-10

## 2025-02-10 NOTE — TELEPHONE ENCOUNTER
----- Message from Oriana sent at 2/10/2025  1:41 PM CST -----  Patient said Neena increased her bladder medication and it is too at [address], can we please call it in to [facility]

## 2025-02-24 DIAGNOSIS — N32.81 OVERACTIVE BLADDER: ICD-10-CM

## 2025-02-24 RX ORDER — MIRABEGRON 50 MG/1
1 TABLET, FILM COATED, EXTENDED RELEASE ORAL DAILY
Qty: 30 TABLET | Refills: 2 | Status: SHIPPED | OUTPATIENT
Start: 2025-02-24

## 2025-03-24 ENCOUNTER — OFFICE VISIT (OUTPATIENT)
Dept: FAMILY MEDICINE | Facility: CLINIC | Age: 85
End: 2025-03-24
Payer: MEDICARE

## 2025-03-24 VITALS
WEIGHT: 202 LBS | TEMPERATURE: 97 F | BODY MASS INDEX: 38.14 KG/M2 | RESPIRATION RATE: 18 BRPM | OXYGEN SATURATION: 97 % | HEART RATE: 88 BPM | SYSTOLIC BLOOD PRESSURE: 124 MMHG | HEIGHT: 61 IN | DIASTOLIC BLOOD PRESSURE: 66 MMHG

## 2025-03-24 DIAGNOSIS — J06.9 UPPER RESPIRATORY TRACT INFECTION, UNSPECIFIED TYPE: Primary | ICD-10-CM

## 2025-03-24 DIAGNOSIS — R68.89 FLU-LIKE SYMPTOMS: ICD-10-CM

## 2025-03-24 DIAGNOSIS — R05.9 COUGH, UNSPECIFIED TYPE: ICD-10-CM

## 2025-03-24 LAB
CTP QC/QA: YES
MOLECULAR STREP A: NEGATIVE
POC MOLECULAR INFLUENZA A AGN: NEGATIVE
POC MOLECULAR INFLUENZA B AGN: NEGATIVE
SARS-COV-2 RDRP RESP QL NAA+PROBE: NEGATIVE

## 2025-03-24 PROCEDURE — 96372 THER/PROPH/DIAG INJ SC/IM: CPT | Mod: ,,, | Performed by: NURSE PRACTITIONER

## 2025-03-24 PROCEDURE — 87400 INFLUENZA A/B EACH AG IA: CPT | Mod: QW,RHCUB | Performed by: NURSE PRACTITIONER

## 2025-03-24 PROCEDURE — 87400 INFLUENZA A/B EACH AG IA: CPT | Mod: 59,QW,RHCUB | Performed by: NURSE PRACTITIONER

## 2025-03-24 PROCEDURE — 99214 OFFICE O/P EST MOD 30 MIN: CPT | Mod: ,,, | Performed by: NURSE PRACTITIONER

## 2025-03-24 PROCEDURE — 87651 STREP A DNA AMP PROBE: CPT | Mod: QW,RHCUB | Performed by: NURSE PRACTITIONER

## 2025-03-24 PROCEDURE — 87635 SARS-COV-2 COVID-19 AMP PRB: CPT | Mod: QW,RHCUB | Performed by: NURSE PRACTITIONER

## 2025-03-24 RX ORDER — DEXAMETHASONE SODIUM PHOSPHATE 10 MG/ML
10 INJECTION INTRAMUSCULAR; INTRAVENOUS
Status: COMPLETED | OUTPATIENT
Start: 2025-03-24 | End: 2025-03-24

## 2025-03-24 RX ADMIN — DEXAMETHASONE SODIUM PHOSPHATE 10 MG: 10 INJECTION INTRAMUSCULAR; INTRAVENOUS at 03:03

## 2025-03-24 NOTE — PROGRESS NOTES
SUBJECTIVE:  Leigh Ann Rubio is a 85 y.o. female here alone for Cough (Non productive), Otalgia (bilateral), Sore Throat, Headache, and Generalized Body Aches      History of Present Illness    CHIEF COMPLAINT:  Patient presents today for cough and ear symptoms.    CURRENT SYMPTOMS:  She reports ear pain with itching and congestion, and experienced severe coughing episodes last night. She denies fever, shortness of breath, and wheezing.    MEDICAL HISTORY:  She has a history of recurrent sinus infections. She has a history of breast cancer diagnosed in December, for which she underwent mastectomy without requiring chemotherapy. Post-surgery, she required minimal pain medication, taking only one dose before discontinuing    CURRENT MEDICATIONS:  She is currently taking Mucinex and has previously had codeine/guaifenesin with great improvement with no negative side effects.     LABS:  COVID, flu, and strep tests were negative.      ROS:  General: -fever, -chills, -fatigue, -weight gain, -weight loss  Eyes: -vision changes, -redness, -discharge  ENT: +ear pain, -nasal congestion, -sore throat, +ear pruritus, +ear pressure, +frequent sneezing  Cardiovascular: -chest pain, -palpitations, -lower extremity edema  Respiratory: +cough, -shortness of breath, +waking at night coughing  Gastrointestinal: -abdominal pain, -nausea, -vomiting, -diarrhea, -constipation, -blood in stool  Genitourinary: -dysuria, -hematuria, -frequency  Musculoskeletal: -joint pain, -muscle pain  Skin: -rash, -lesion  Neurological: -headache, -dizziness, -numbness, -tingling  Psychiatric: -anxiety, -depression, -sleep difficulty          Judis allergies, medications, history, and problem list were updated as appropriate.      A comprehensive review of symptoms was completed and negative except as noted above.    OBJECTIVE:  Vital signs  Vitals:    03/24/25 1459   BP: 124/66   BP Location: Right arm   Patient Position: Sitting   Pulse: 88   Resp: 18  "  Temp: 97.4 °F (36.3 °C)   TempSrc: Temporal   SpO2: 97%   Weight: 91.6 kg (202 lb)   Height: 5' 1" (1.549 m)        Physical Exam  Vitals and nursing note reviewed.   Constitutional:       Appearance: Normal appearance.   HENT:      Head: Normocephalic and atraumatic.      Right Ear: Ear canal and external ear normal. A middle ear effusion is present.      Left Ear: Ear canal and external ear normal. A middle ear effusion is present.      Nose: Nose normal.      Mouth/Throat:      Mouth: Mucous membranes are moist.      Pharynx: Oropharynx is clear. Posterior oropharyngeal erythema present.   Eyes:      Extraocular Movements: Extraocular movements intact.      Conjunctiva/sclera: Conjunctivae normal.      Pupils: Pupils are equal, round, and reactive to light.   Cardiovascular:      Rate and Rhythm: Normal rate and regular rhythm.      Pulses: Normal pulses.      Heart sounds: Normal heart sounds.   Pulmonary:      Effort: Pulmonary effort is normal.      Breath sounds: Normal breath sounds.   Abdominal:      General: Abdomen is flat. Bowel sounds are normal.      Palpations: Abdomen is soft.   Musculoskeletal:         General: Normal range of motion.      Cervical back: Normal range of motion.   Skin:     General: Skin is warm and dry.      Capillary Refill: Capillary refill takes less than 2 seconds.   Neurological:      General: No focal deficit present.      Mental Status: She is alert.   Psychiatric:         Mood and Affect: Mood normal.         Behavior: Behavior normal.         Thought Content: Thought content normal.         Judgment: Judgment normal.          Office Visit on 03/24/2025   Component Date Value Ref Range Status    POC Molecular Influenza A Ag 03/24/2025 Negative  Negative Final    POC Molecular Influenza B Ag 03/24/2025 Negative  Negative Final     Acceptable 03/24/2025 Yes   Final    Molecular Strep A, POC 03/24/2025 Negative  Negative Final     Acceptable " 03/24/2025 Yes   Final    POC Rapid COVID 03/24/2025 Negative  Negative Final     Acceptable 03/24/2025 Yes   Final          ASSESSMENT/PLAN:  Assessment & Plan    R68.89 Flu-like symptoms  R05.9 Cough, unspecified type    IMPRESSION:  - Assessed symptoms of cough, ear pain, and sinus congestion.  - Ruled out COVID, flu, and strep based on negative test results.  - Initiated steroid injection and cough medication as initial treatment approach.  - Opted to delay antibiotic prescription, planning to reassess need in a few days.  - Noted decision to decline estrogen blocker post-breast cancer treatment.    R68.89 FLU-LIKE SYMPTOMS:  - Administered steroid injection in office.  - Contact the office Wednesday or Thursday if symptoms are not improving.    R05.9 COUGH, UNSPECIFIED TYPE:  - Started cough medication.  - Continued Mucinex.        1. Upper respiratory tract infection, unspecified type  Assessment & Plan:  Dexamethasone administered, known to tolerate  Codeine/guaifenesin qhs, known to tolerate  Rest, hydrate, mucinex bid, ibuprofen and tylenol       2. Flu-like symptoms  -     POCT Influenza A/B Molecular  -     POCT Strep A, Molecular  -     POCT COVID-19 Rapid Screening  -     dexAMETHasone injection 10 mg    3. Cough, unspecified type  -     codeine-guaiFENesin 6.3-100 mg/5 mL Liqd; Take 5 mLs by mouth nightly as needed (cough).  Dispense: 50 mL; Refill: 0         Follow Up:  No follow-ups on file.    If a referral was sent and you are not notified and scheduled with specialist within 2 weeks, please notify office to ensure specialty appointment is scheduled in a timely manner.   Discussed the diagnosis, prognosis, plan of care, chronic nature of and indications for, risks and benefits of treatment for conditions.  Continue all medications as prescribed unless otherwise noted.   Call if patient develops other symptoms or if not better in 2-3 days and sooner if worse. Emphasized the importance  of compliance with all recommendations, including medication use and timely follow up. Instructed to return as noted be or sooner if patient develops any other problems or if there are any other additional questions or concerns.      This note was generated with the assistance of ambient listening technology. Verbal consent was obtained by the patient and accompanying visitor(s) for the recording of patient appointment to facilitate this note. I attest to having reviewed and edited the generated note for accuracy, though some syntax or spelling errors may persist. Please contact the author of this note for any clarification.

## 2025-03-24 NOTE — ASSESSMENT & PLAN NOTE
Dexamethasone administered, known to tolerate  Codeine/guaifenesin qhs, known to tolerate  Rest, hydrate, mucinex bid, ibuprofen and tylenol

## 2025-03-26 ENCOUNTER — TELEPHONE (OUTPATIENT)
Dept: FAMILY MEDICINE | Facility: CLINIC | Age: 85
End: 2025-03-26
Payer: MEDICARE

## 2025-03-26 DIAGNOSIS — J32.9 SINUSITIS, UNSPECIFIED CHRONICITY, UNSPECIFIED LOCATION: Primary | ICD-10-CM

## 2025-03-26 RX ORDER — AMOXICILLIN 875 MG/1
875 TABLET, FILM COATED ORAL 2 TIMES DAILY
COMMUNITY
End: 2025-03-26 | Stop reason: SDUPTHER

## 2025-03-26 RX ORDER — AMOXICILLIN 875 MG/1
875 TABLET, FILM COATED ORAL 2 TIMES DAILY
Qty: 20 TABLET | Refills: 0 | Status: SHIPPED | OUTPATIENT
Start: 2025-03-26 | End: 2025-04-05

## 2025-03-26 NOTE — TELEPHONE ENCOUNTER
----- Message from Med Assistant Slaeem sent at 3/26/2025  1:20 PM CDT -----  Pt was seen by radha - still isnt feeling the best and wants to know if she should be on antibiotic. Radha said to call if she didn't get better  [address]

## 2025-03-31 ENCOUNTER — OFFICE VISIT (OUTPATIENT)
Dept: FAMILY MEDICINE | Facility: CLINIC | Age: 85
End: 2025-03-31
Payer: MEDICARE

## 2025-03-31 VITALS
RESPIRATION RATE: 20 BRPM | TEMPERATURE: 99 F | BODY MASS INDEX: 37.57 KG/M2 | SYSTOLIC BLOOD PRESSURE: 138 MMHG | HEART RATE: 104 BPM | WEIGHT: 199 LBS | DIASTOLIC BLOOD PRESSURE: 70 MMHG | OXYGEN SATURATION: 94 % | HEIGHT: 61 IN

## 2025-03-31 DIAGNOSIS — I10 PRIMARY HYPERTENSION: ICD-10-CM

## 2025-03-31 DIAGNOSIS — J42 ACUTE EXACERBATION OF CHRONIC BRONCHITIS: ICD-10-CM

## 2025-03-31 DIAGNOSIS — J44.9 CHRONIC OBSTRUCTIVE PULMONARY DISEASE, UNSPECIFIED COPD TYPE: Primary | ICD-10-CM

## 2025-03-31 DIAGNOSIS — J20.9 ACUTE EXACERBATION OF CHRONIC BRONCHITIS: ICD-10-CM

## 2025-03-31 PROCEDURE — 99214 OFFICE O/P EST MOD 30 MIN: CPT | Mod: ,,, | Performed by: NURSE PRACTITIONER

## 2025-03-31 PROCEDURE — 94640 AIRWAY INHALATION TREATMENT: CPT | Mod: ,,, | Performed by: NURSE PRACTITIONER

## 2025-03-31 PROCEDURE — 96372 THER/PROPH/DIAG INJ SC/IM: CPT | Mod: ,,, | Performed by: NURSE PRACTITIONER

## 2025-03-31 RX ORDER — ALBUTEROL SULFATE 90 UG/1
2 INHALANT RESPIRATORY (INHALATION) EVERY 6 HOURS PRN
Qty: 8 G | Refills: 1 | Status: SHIPPED | OUTPATIENT
Start: 2025-03-31

## 2025-03-31 RX ORDER — BUDESONIDE, GLYCOPYRROLATE, AND FORMOTEROL FUMARATE 160; 9; 4.8 UG/1; UG/1; UG/1
2 AEROSOL, METERED RESPIRATORY (INHALATION) 2 TIMES DAILY
Qty: 10.7 G | Refills: 0 | COMMUNITY
Start: 2025-03-31

## 2025-03-31 RX ORDER — CEFTRIAXONE 1 G/1
1 INJECTION, POWDER, FOR SOLUTION INTRAMUSCULAR; INTRAVENOUS
Status: COMPLETED | OUTPATIENT
Start: 2025-03-31 | End: 2025-03-31

## 2025-03-31 RX ORDER — IPRATROPIUM BROMIDE AND ALBUTEROL SULFATE 2.5; .5 MG/3ML; MG/3ML
3 SOLUTION RESPIRATORY (INHALATION)
Status: COMPLETED | OUTPATIENT
Start: 2025-03-31 | End: 2025-03-31

## 2025-03-31 RX ORDER — DEXAMETHASONE SODIUM PHOSPHATE 10 MG/ML
10 INJECTION INTRAMUSCULAR; INTRAVENOUS
Status: COMPLETED | OUTPATIENT
Start: 2025-03-31 | End: 2025-03-31

## 2025-03-31 RX ORDER — DOXYCYCLINE 100 MG/1
100 CAPSULE ORAL EVERY 12 HOURS
Qty: 20 CAPSULE | Refills: 0 | Status: SHIPPED | OUTPATIENT
Start: 2025-03-31

## 2025-03-31 RX ADMIN — DEXAMETHASONE SODIUM PHOSPHATE 10 MG: 10 INJECTION INTRAMUSCULAR; INTRAVENOUS at 05:03

## 2025-03-31 RX ADMIN — IPRATROPIUM BROMIDE AND ALBUTEROL SULFATE 3 ML: 2.5; .5 SOLUTION RESPIRATORY (INHALATION) at 04:03

## 2025-03-31 RX ADMIN — CEFTRIAXONE 1 G: 1 INJECTION, POWDER, FOR SOLUTION INTRAMUSCULAR; INTRAVENOUS at 05:03

## 2025-03-31 NOTE — PROGRESS NOTES
Patient ID: Leigh Ann Rubio  : 1940     Chief Complaint: Wheezing and Cough (Patient reports for cough and wheezing. Patient reports she has been doing breathing treatments twice a day but has not done one today. )    Allergies: Patient has no known allergies.     History of Present Illness:  The patient is a 85 y.o. White female who presents to clinic for evaluation and management with a chief complaint of Wheezing and Cough (Patient reports for cough and wheezing. Patient reports she has been doing breathing treatments twice a day but has not done one today. )   Wheezing   This is a chronic problem. The current episode started 1 to 4 weeks ago. The problem occurs 2 to 4 times per day. The problem has been gradually worsening. Associated symptoms include coughing, hemoptysis, rhinorrhea and shortness of breath. Pertinent negatives include no abdominal pain, chest pain, chills, coryza, ear pain, fever, headaches, neck pain, sore throat or sputum production. The symptoms are aggravated by pollens, weather changes, lying flat, any activity and exercise. She has tried prescription cough suppressant, rest and body position changes for the symptoms. The treatment provided no relief. Her past medical history is significant for chronic lung disease. There is no history of asthma.   Cough  This is a recurrent problem. The current episode started 1 to 4 weeks ago. The problem has been gradually worsening. The problem occurs every few minutes. The cough is Non-productive. Associated symptoms include hemoptysis, rhinorrhea, shortness of breath and wheezing. Pertinent negatives include no chest pain, chills, ear pain, fever, headaches or sore throat. The symptoms are aggravated by lying down. She has tried body position changes, OTC cough suppressant, prescription cough suppressant and rest for the symptoms. The treatment provided mild relief. Her past medical history is significant for bronchitis. There is no history  of asthma.        Past Medical History:  has a past medical history of History of iron deficiency, Invasive ductal carcinoma of breast, female, left, Mixed hyperlipidemia, Obesity, unspecified, Prediabetes, and Vitamin D deficiency.    Social History:  reports that she has never smoked. She has never been exposed to tobacco smoke. She has never used smokeless tobacco. She reports that she does not drink alcohol and does not use drugs.    Care Team: Patient Care Team:  Neena Ryder DNP as PCP - General (Family Medicine)  Jillain Howard MD as Consulting Physician (General Surgery)     Current Medications:  Current Outpatient Medications   Medication Instructions    albuterol (PROVENTIL HFA) 90 mcg/actuation inhaler 2 puffs, Inhalation, Every 6 hours PRN, Rescue    amLODIPine (NORVASC) 10 mg, Oral, Daily    atorvastatin (LIPITOR) 40 mg, Oral, Nightly    azelastine (ASTELIN) 137 mcg, Nasal, 2 times daily    baclofen (LIORESAL) 10 mg, Oral, Daily PRN    budesonide-glycopyr-formoterol (BREZTRI AEROSPHERE) 160-9-4.8 mcg/actuation HFAA 2 puffs, Inhalation, 2 times daily    budesonide-glycopyr-formoterol 160-9-4.8 mcg/actuation HFAA 2 puffs, Inhalation, 2 times daily, Rinse mouth after use    cetirizine (ZYRTEC) 10 mg, Oral, Daily    citalopram (CELEXA) 40 MG tablet TAKE ONE(1) TAB BY MOUTH EVERY DAY WITH FOOD FOR DEPRESSION, ANXIETY, &/OR IRRITABILITY    codeine-guaiFENesin 6.3-100 mg/5 mL Liqd 5 mLs, Oral, 3 times daily PRN    doxycycline (MONODOX) 100 mg, Oral, Every 12 hours    guaiFENesin (MUCINEX) 600 mg, Oral, 2 times daily    isosorbide mononitrate (IMDUR) 60 mg, Oral, 2 times daily    lisinopriL 10 MG tablet TAKE ONE(1) TAB BY MOUTH TWICE DAILY FOR BLOOD PRESSURE    meloxicam (MOBIC) 7.5 mg, Oral, Daily    mirabegron (MYRBETRIQ) 50 mg, Oral, Daily    pantoprazole (PROTONIX) 40 mg, Oral, Daily PRN       Review of Systems   Constitutional:  Negative for chills and fever.   HENT:  Positive for rhinorrhea.  "Negative for nasal congestion, ear pain and sore throat.    Respiratory:  Positive for cough, hemoptysis, shortness of breath and wheezing. Negative for sputum production.    Cardiovascular:  Negative for chest pain and leg swelling.   Gastrointestinal:  Negative for abdominal pain.   Musculoskeletal:  Negative for neck pain.   Neurological:  Negative for headaches.        Visit Vitals  /70 (BP Location: Right arm, Patient Position: Sitting)   Pulse 104   Temp 99.1 °F (37.3 °C)   Resp 20   Ht 5' 1" (1.549 m)   Wt 90.3 kg (199 lb)   SpO2 (!) 94%   BMI 37.60 kg/m²       Physical Exam  Constitutional:       Appearance: Normal appearance.   HENT:      Head: Normocephalic.      Nose: Nose normal.      Mouth/Throat:      Mouth: Mucous membranes are dry.   Eyes:      Extraocular Movements: Extraocular movements intact.      Conjunctiva/sclera: Conjunctivae normal.   Cardiovascular:      Rate and Rhythm: Normal rate and regular rhythm.      Pulses: Normal pulses.      Heart sounds: Normal heart sounds.   Pulmonary:      Effort: Pulmonary effort is normal. No respiratory distress.      Breath sounds: Decreased air movement present. Examination of the left-upper field reveals wheezing. Examination of the right-lower field reveals decreased breath sounds. Examination of the left-lower field reveals decreased breath sounds and rales. Decreased breath sounds, wheezing, rhonchi and rales present.   Abdominal:      General: Bowel sounds are normal.      Palpations: Abdomen is soft.   Musculoskeletal:         General: Normal range of motion.      Cervical back: Normal range of motion.   Skin:     General: Skin is warm and dry.      Capillary Refill: Capillary refill takes less than 2 seconds.   Neurological:      General: No focal deficit present.      Mental Status: She is alert.   Psychiatric:         Mood and Affect: Mood normal.          Labs Reviewed:  Chemistry:  Lab Results   Component Value Date     (L) " 01/27/2025    K 4.2 01/27/2025    BUN 16 01/27/2025    CREATININE 0.69 01/27/2025    EGFRNORACEVR 86 01/27/2025    GLUCOSE 108 01/27/2025    CALCIUM 10.1 01/27/2025    ALKPHOS 91 01/27/2025    LABPROT 6.4 01/27/2025    ALBUMIN 4.1 01/27/2025    AST 26 01/27/2025    ALT 18 01/27/2025    TGDAFCYX55PS 33 11/04/2024    TSH 2.870 05/07/2024    QSZCWK8XFIX 1.20 05/15/2023        Lab Results   Component Value Date    HGBA1C 5.9 11/04/2024        Hematology:  Lab Results   Component Value Date    WBC 5.53 01/27/2025    RBC 4.27 01/27/2025    HGB 13.2 01/27/2025    HCT 38.4 01/27/2025    MCV 89.9 01/27/2025    MCH 30.9 01/27/2025    MCHC 34.4 01/27/2025    RDW 13.0 01/27/2025     01/27/2025    MPV 10.2 01/27/2025       Lipid Panel:  Lab Results   Component Value Date    CHOL 134 11/04/2024    HDL 65 (H) 11/04/2024    LDLDIRECT 50.6 11/04/2024    TRIG 64 11/04/2024        Assessment & Plan:  1. Chronic obstructive pulmonary disease, unspecified COPD type  -     albuterol-ipratropium 2.5 mg-0.5 mg/3 mL nebulizer solution 3 mL  -     X-Ray Chest PA And Lateral; Future; Expected date: 03/31/2025  -     codeine-guaiFENesin 6.3-100 mg/5 mL Liqd; Take 5 mLs by mouth 3 (three) times daily as needed (cough).  Dispense: 120 mL; Refill: 0  -     doxycycline (MONODOX) 100 MG capsule; Take 1 capsule (100 mg total) by mouth every 12 (twelve) hours.  Dispense: 20 capsule; Refill: 0  -     budesonide-glycopyr-formoterol 160-9-4.8 mcg/actuation HFAA; Inhale 2 puffs into the lungs 2 (two) times a day. Rinse mouth after use  Dispense: 10.7 g; Refill: 1  -     albuterol (PROVENTIL HFA) 90 mcg/actuation inhaler; Inhale 2 puffs into the lungs every 6 (six) hours as needed for Wheezing. Rescue  Dispense: 8 g; Refill: 1  -     budesonide-glycopyr-formoterol (BREZTRI AEROSPHERE) 160-9-4.8 mcg/actuation HFAA; Inhale 2 puffs into the lungs 2 (two) times a day.  Dispense: 10.7 g; Refill: 0  -     cefTRIAXone injection 1 g  -     dexAMETHasone  injection 10 mg    2. Acute exacerbation of chronic bronchitis  Assessment & Plan:  Patient has had acute exacerbation with tight wheeze and decreased breath sound.  Albuterol with Atrovent per nebulizer and converted to coarse rhonchi but still with decreased breath sounds bibasilarly and rales noted to left lateral lung fields.  Chest x-ray Rocephin and dexamethasone.  Begin respiratory 2 puffs b.i.d. with albuterol q.i.d. p.r.n. but if not much better within 2 days notify office may still need systemic oral cortisone.      3. Primary hypertension  Assessment & Plan:  Norvasc 10 mg 1 p.o. q.d. lisinopril 10 mg q.d. low-sodium diet. The current medical regimen is effective; continue present plan and medications.  Blood pressure has been slightly more elevated since respiratory in congestion monitor blood pressure carefully and notify if greater than 140/90 with respiratory treatment..           Future Appointments   Date Time Provider Department Center   4/7/2025 10:20 AM Neena Ryder DNP [facility] [place]   4/28/2025  8:00 AM [facility] [place]   5/5/2025  7:55 AM NURSE, [facility] FAMILY MEDICINE [facility] [place]   6/16/2025 10:00 AM CHAIR Su, OA CHEMOTHERAPY INFUSION [facility] CHEMO [place]       Follow up in about 10 days (around 4/10/2025). Call sooner if needed.    Neena Ryder DNP

## 2025-04-01 NOTE — ASSESSMENT & PLAN NOTE
Patient has had acute exacerbation with tight wheeze and decreased breath sound.  Albuterol with Atrovent per nebulizer and converted to coarse rhonchi but still with decreased breath sounds bibasilarly and rales noted to left lateral lung fields.  Chest x-ray Rocephin and dexamethasone.  Begin respiratory 2 puffs b.i.d. with albuterol q.i.d. p.r.n. but if not much better within 2 days notify office may still need systemic oral cortisone.

## 2025-04-01 NOTE — ASSESSMENT & PLAN NOTE
Norvasc 10 mg 1 p.o. q.d. lisinopril 10 mg q.d. low-sodium diet. The current medical regimen is effective; continue present plan and medications.  Blood pressure has been slightly more elevated since respiratory in congestion monitor blood pressure carefully and notify if greater than 140/90 with respiratory treatment..

## 2025-04-02 ENCOUNTER — TELEPHONE (OUTPATIENT)
Dept: FAMILY MEDICINE | Facility: CLINIC | Age: 85
End: 2025-04-02
Payer: MEDICARE

## 2025-04-02 NOTE — TELEPHONE ENCOUNTER
----- Message from Oriana sent at 3/31/2025  5:07 PM CDT -----  Call Patient to see how she is doing

## 2025-04-02 NOTE — TELEPHONE ENCOUNTER
Patient states starting to feel better but still coughing a lot and starting to break up. Is doing nebulizer as ordered and is helping.

## 2025-04-07 ENCOUNTER — OFFICE VISIT (OUTPATIENT)
Dept: FAMILY MEDICINE | Facility: CLINIC | Age: 85
End: 2025-04-07
Payer: MEDICARE

## 2025-04-07 VITALS
WEIGHT: 201 LBS | DIASTOLIC BLOOD PRESSURE: 68 MMHG | HEART RATE: 91 BPM | SYSTOLIC BLOOD PRESSURE: 124 MMHG | BODY MASS INDEX: 37.95 KG/M2 | OXYGEN SATURATION: 95 % | HEIGHT: 61 IN | TEMPERATURE: 98 F

## 2025-04-07 DIAGNOSIS — J20.9 ACUTE EXACERBATION OF CHRONIC BRONCHITIS: Primary | ICD-10-CM

## 2025-04-07 DIAGNOSIS — J42 ACUTE EXACERBATION OF CHRONIC BRONCHITIS: Primary | ICD-10-CM

## 2025-04-07 DIAGNOSIS — J44.9 CHRONIC OBSTRUCTIVE PULMONARY DISEASE, UNSPECIFIED COPD TYPE: ICD-10-CM

## 2025-04-07 RX ORDER — DOXYCYCLINE HYCLATE 100 MG
100 TABLET ORAL EVERY 12 HOURS
COMMUNITY
Start: 2025-04-01

## 2025-04-07 RX ORDER — GUAIFENESIN 600 MG/1
600 TABLET, EXTENDED RELEASE ORAL DAILY PRN
Qty: 24 TABLET | Refills: 1 | Status: SHIPPED | OUTPATIENT
Start: 2025-04-07

## 2025-04-07 NOTE — PROGRESS NOTES
Patient ID: Leigh Ann Rubio  : 1940     Chief Complaint: Follow-up (1 week follow up. Patient is feeling a lot better. )    Allergies: Patient has no known allergies.     History of Present Illness:  The patient is a 85 y.o. White female who presents to clinic for evaluation and management with a chief complaint of Follow-up (1 week follow up. Patient is feeling a lot better. )   History of Present Illness      Has not been running any fever feeling breathing has improved much less wheezing than before but still has some now it is only a dry cough and not feeling as much shortness a breath as she had last visit.  No GI upset or problem no headache and feeling that she has tolerated the medication in his feeling much better         Past Medical History:  has a past medical history of History of iron deficiency, Invasive ductal carcinoma of breast, female, left, Mixed hyperlipidemia, Obesity, unspecified, Prediabetes, and Vitamin D deficiency.    Social History:  reports that she has never smoked. She has never been exposed to tobacco smoke. She has never used smokeless tobacco. She reports that she does not drink alcohol and does not use drugs.    Care Team: Patient Care Team:  Neena Ryder DNP as PCP - General (Family Medicine)  Jillian Howard MD as Consulting Physician (General Surgery)     Current Medications:  Current Outpatient Medications   Medication Instructions    albuterol (PROVENTIL HFA) 90 mcg/actuation inhaler 2 puffs, Inhalation, Every 6 hours PRN, Rescue    amLODIPine (NORVASC) 10 mg, Oral, Daily    atorvastatin (LIPITOR) 40 mg, Oral, Nightly    azelastine (ASTELIN) 137 mcg, Nasal, 2 times daily    baclofen (LIORESAL) 10 mg, Oral, Daily PRN    budesonide-glycopyr-formoterol (BREZTRI AEROSPHERE) 160-9-4.8 mcg/actuation HFAA 2 puffs, Inhalation, 2 times daily    budesonide-glycopyr-formoterol 160-9-4.8 mcg/actuation HFAA 2 puffs, Inhalation, 2 times daily, Rinse mouth after use     "cetirizine (ZYRTEC) 10 mg, Oral, Daily    citalopram (CELEXA) 40 MG tablet TAKE ONE(1) TAB BY MOUTH EVERY DAY WITH FOOD FOR DEPRESSION, ANXIETY, &/OR IRRITABILITY    codeine-guaiFENesin 6.3-100 mg/5 mL Liqd 5 mLs, Oral, Nightly PRN    doxycycline (MONODOX) 100 mg, Oral, Every 12 hours    doxycycline (VIBRA-TABS) 100 mg, Every 12 hours    guaiFENesin (MUCINEX) 600 mg, Oral, Daily PRN    isosorbide mononitrate (IMDUR) 60 mg, Oral, 2 times daily    lisinopriL 10 MG tablet TAKE ONE(1) TAB BY MOUTH TWICE DAILY FOR BLOOD PRESSURE    meloxicam (MOBIC) 7.5 mg, Oral, Daily    mirabegron (MYRBETRIQ) 50 mg, Oral, Daily    pantoprazole (PROTONIX) 40 mg, Oral, Daily PRN       Review of Systems   Constitutional:  Negative for activity change, appetite change, chills, fatigue and night sweats.   HENT:  Positive for nasal congestion. Negative for ear pain.    Respiratory:  Positive for cough, shortness of breath (with walking but improved) and wheezing.    Cardiovascular:  Negative for chest pain, palpitations and leg swelling.   Gastrointestinal: Negative.    Genitourinary: Negative.    Allergic/Immunologic: Positive for environmental allergies.   Neurological: Negative.    Hematological: Negative.    Psychiatric/Behavioral: Negative.  Negative for sleep disturbance.         Visit Vitals  /68 (BP Location: Right arm, Patient Position: Sitting)   Pulse 91   Temp 97.8 °F (36.6 °C)   Ht 5' 1" (1.549 m)   Wt 91.2 kg (201 lb)   SpO2 95%   BMI 37.98 kg/m²       Physical Exam  Constitutional:       Appearance: Normal appearance.   HENT:      Head: Normocephalic.      Nose: Nose normal.      Mouth/Throat:      Mouth: Mucous membranes are dry.   Eyes:      Extraocular Movements: Extraocular movements intact.      Conjunctiva/sclera: Conjunctivae normal.   Cardiovascular:      Rate and Rhythm: Normal rate and regular rhythm.      Pulses: Normal pulses.      Heart sounds: Normal heart sounds.   Pulmonary:      Effort: Pulmonary effort " is normal. No respiratory distress.      Breath sounds: Decreased air movement present. Examination of the right-lower field reveals decreased breath sounds. Examination of the left-lower field reveals decreased breath sounds and rales. Decreased breath sounds, rhonchi (end expiratory upper scattered) and rales (right lateral base) present. No wheezing.   Abdominal:      General: Bowel sounds are normal.      Palpations: Abdomen is soft.   Musculoskeletal:         General: Normal range of motion.      Cervical back: Normal range of motion.   Skin:     General: Skin is warm and dry.      Capillary Refill: Capillary refill takes less than 2 seconds.   Neurological:      General: No focal deficit present.      Mental Status: She is alert and oriented to person, place, and time.   Psychiatric:         Mood and Affect: Mood normal.          Labs Reviewed:  Chemistry:  Lab Results   Component Value Date     (L) 01/27/2025    K 4.2 01/27/2025    BUN 16 01/27/2025    CREATININE 0.69 01/27/2025    EGFRNORACEVR 86 01/27/2025    GLUCOSE 108 01/27/2025    CALCIUM 10.1 01/27/2025    ALKPHOS 91 01/27/2025    LABPROT 6.4 01/27/2025    ALBUMIN 4.1 01/27/2025    AST 26 01/27/2025    ALT 18 01/27/2025    [id number] 33 11/04/2024    TSH 2.870 05/07/2024    MRSMHQ3CAMG 1.20 05/15/2023        Lab Results   Component Value Date    HGBA1C 5.9 11/04/2024        Hematology:  Lab Results   Component Value Date    WBC 5.53 01/27/2025    RBC 4.27 01/27/2025    HGB 13.2 01/27/2025    HCT 38.4 01/27/2025    MCV 89.9 01/27/2025    MCH 30.9 01/27/2025    MCHC 34.4 01/27/2025    RDW 13.0 01/27/2025     01/27/2025    MPV 10.2 01/27/2025       Lipid Panel:  Lab Results   Component Value Date    CHOL 134 11/04/2024    HDL 65 (H) 11/04/2024    LDLDIRECT 50.6 11/04/2024    TRIG 64 11/04/2024        Assessment & Plan:  1. Acute exacerbation of chronic bronchitis  Assessment & Plan:  Using proventil 2 puffs twice daily for remainder of  month. Complete antibiotic, if worsening call immediately. Still with dry cough worse at night, take mucinex in am. Improved greatly, call any problems.       2. Chronic obstructive pulmonary disease, unspecified COPD type  -     codeine-guaiFENesin 6.3-100 mg/5 mL Liqd; Take 5 mLs by mouth nightly as needed (cough).  Dispense: 90 mL; Refill: 0  -     guaiFENesin (MUCINEX) 600 mg 12 hr tablet; Take 1 tablet (600 mg total) by mouth daily as needed for Congestion.  Dispense: 24 tablet; Refill: 1         Assessment & Plan               Future Appointments   Date Time Provider Department Center   4/28/2025  8:00 AM Neena Ryder DNP [facility]   5/5/2025  7:55 AM NURSE, [facility] FAMILY MEDICINE [facility]   6/16/2025 10:00 AM CHAIR 02, [facility] CHEMOTHERAPY INFUSION [facility] CHEMO [facility]       Follow up in about 4 weeks (around 5/5/2025). Call sooner if needed.      This note was generated with the assistance of ambient listening technology. Verbal consent was obtained by the patient and accompanying visitor(s) for the recording of patient appointment to facilitate this note. I attest to having reviewed and edited the generated note for accuracy, though some syntax or spelling errors may persist. Please contact the author of this note for any clarification.      Neena Ryder DNP    Lab Frequency Next Occurrence   X-Ray Chest PA And Lateral Once 03/31/2025

## 2025-04-07 NOTE — ASSESSMENT & PLAN NOTE
Using proventil 2 puffs twice daily for remainder of month. Complete antibiotic, if worsening call immediately. Still with dry cough worse at night, take mucinex in am. Improved greatly, call any problems.

## 2025-04-21 DIAGNOSIS — K21.9 GASTROESOPHAGEAL REFLUX DISEASE, UNSPECIFIED WHETHER ESOPHAGITIS PRESENT: ICD-10-CM

## 2025-04-21 RX ORDER — PANTOPRAZOLE SODIUM 40 MG/1
TABLET, DELAYED RELEASE ORAL
Qty: 70 TABLET | Refills: 1 | Status: SHIPPED | OUTPATIENT
Start: 2025-04-21

## 2025-04-28 ENCOUNTER — OFFICE VISIT (OUTPATIENT)
Dept: FAMILY MEDICINE | Facility: CLINIC | Age: 85
End: 2025-04-28
Payer: MEDICARE

## 2025-04-28 VITALS
OXYGEN SATURATION: 97 % | DIASTOLIC BLOOD PRESSURE: 78 MMHG | BODY MASS INDEX: 37.61 KG/M2 | WEIGHT: 199.19 LBS | SYSTOLIC BLOOD PRESSURE: 136 MMHG | HEART RATE: 88 BPM | TEMPERATURE: 98 F | HEIGHT: 61 IN

## 2025-04-28 DIAGNOSIS — Z71.89 ACP (ADVANCE CARE PLANNING): ICD-10-CM

## 2025-04-28 DIAGNOSIS — M81.0 AGE-RELATED OSTEOPOROSIS WITHOUT CURRENT PATHOLOGICAL FRACTURE: ICD-10-CM

## 2025-04-28 DIAGNOSIS — I10 PRIMARY HYPERTENSION: ICD-10-CM

## 2025-04-28 DIAGNOSIS — Z00.00 MEDICARE ANNUAL WELLNESS VISIT, SUBSEQUENT: Primary | ICD-10-CM

## 2025-04-28 DIAGNOSIS — E78.2 MIXED HYPERLIPIDEMIA: ICD-10-CM

## 2025-04-28 DIAGNOSIS — Z23 IMMUNIZATION DUE: ICD-10-CM

## 2025-04-28 DIAGNOSIS — I10 HYPERTENSION, UNSPECIFIED TYPE: ICD-10-CM

## 2025-04-28 DIAGNOSIS — J41.0 SIMPLE CHRONIC BRONCHITIS: ICD-10-CM

## 2025-04-28 PROCEDURE — G0439 PPPS, SUBSEQ VISIT: HCPCS | Mod: ,,, | Performed by: NURSE PRACTITIONER

## 2025-04-28 PROCEDURE — 90471 IMMUNIZATION ADMIN: CPT | Mod: ,,, | Performed by: NURSE PRACTITIONER

## 2025-04-28 PROCEDURE — 90750 HZV VACC RECOMBINANT IM: CPT | Mod: ,,, | Performed by: NURSE PRACTITIONER

## 2025-04-28 RX ORDER — LISINOPRIL 10 MG/1
TABLET ORAL
Qty: 180 TABLET | Refills: 1 | Status: SHIPPED | OUTPATIENT
Start: 2025-04-28

## 2025-04-28 RX ORDER — ATORVASTATIN CALCIUM 40 MG/1
40 TABLET, FILM COATED ORAL NIGHTLY
Qty: 90 TABLET | Refills: 1 | Status: SHIPPED | OUTPATIENT
Start: 2025-04-28

## 2025-04-28 NOTE — ASSESSMENT & PLAN NOTE
Still with a dry cough lungs clear feeling breathing has improved but not requiring either the Proventil or the Symbicort at this time.  [facility] cough medicine at bedtime if needed but use the inhalers if needed and call any changes or problems.

## 2025-04-28 NOTE — ASSESSMENT & PLAN NOTE
Advance Care Planning  no documents on file, 5 Rights Booklet with surrogate discussed with patient.

## 2025-04-28 NOTE — ASSESSMENT & PLAN NOTE
Prolia injection every 6 months, recheck DEXA 11/25. The current medical regimen is effective;  continue present plan and medications.

## 2025-04-28 NOTE — PROGRESS NOTES
Formerly [facility], [facility]–1983      Patient ID: [id number]     Chief Complaint: Medicare Annual Wellness     HPI:     Leigh Ann Rubio is a 85 y.o. female here today for a Medicare Annual Wellness visit and comprehensive Health Risk Assessment.     A separate E/M code has been provided to evaluate additional complaints that the patient would like addressed during the dedicated Medicare Wellness Exam.    HPI   The patient returns for follow-up with her bronchitis and Medicare wellness.  She is feeling the bronchitis has improved but still does have a lingering cough especially at night.  Not experiencing change in her blood pressure feeling that all of her chronic conditions are controlled and she has a follow-up appointment with her surgeon on May 5th for breast cancer screening.  She is taking all the rest of her medications.  She does have a we will and we discussed a 5 rights in the living will and she would be open to the idea of discussing this.  Otherwise all review of symptoms are negative she is not experiencing any chest pain trouble with her abdominal pain in the bladder is working well.  She has not had any other problems that she is concerned about it this time.  Health Maintenance   Topic Date Due    RSV Vaccine (Age 60+ and Pregnant patients) (1 - 1-dose 75+ series) 11/06/2029 (Originally 1/18/2016)    COVID-19 Vaccine (3 - Pfizer risk series) 12/12/2112 (Originally 5/12/2021)    Shingles Vaccine (2 of 2) 06/23/2025    Hemoglobin A1c (Prediabetes)  11/04/2025    Mammogram  11/12/2025    DEXA Scan  11/21/2026    Lipid Panel  11/04/2029    TETANUS VACCINE  11/05/2034    Influenza Vaccine  Completed    Pneumococcal Vaccines (Age 50+)  Completed        Past Medical History:   Diagnosis Date    History of iron deficiency     Invasive ductal carcinoma of breast, female, left 10/14/2024    Mixed hyperlipidemia     Obesity, unspecified     Prediabetes     Vitamin D deficiency         Past Surgical History:    Procedure Laterality Date    HYSTERECTOMY      KNEE ARTHROSCOPY      MASTECTOMY Left 12/17/2024    TONSILLECTOMY AND ADENOIDECTOMY          Social History     Socioeconomic History    Marital status:    Tobacco Use    Smoking status: Never     Passive exposure: Never    Smokeless tobacco: Never   Substance and Sexual Activity    Alcohol use: Never    Drug use: Never    Sexual activity: Not Currently     Social Drivers of Health     Financial Resource Strain: Patient Declined (12/12/2024)    Overall Financial Resource Strain (CARDIA)     Difficulty of Paying Living Expenses: Patient declined   Food Insecurity: No Food Insecurity (12/17/2024)    Received from [facility] and Aff[facility]    Hunger Vital Sign     Worried About Running Out of Food in the Last Year: Never true     Ran Out of Food in the Last Year: Never true   Transportation Needs: No Transportation Needs (12/17/2024)    Received from [facility]    PRAPARE - Transportation     Lack of Transportation (Medical): No     Lack of Transportation (Non-Medical): No   Stress: Patient Declined (12/12/2024)    [employer] [place] [employer] - Occupational Stress Questionnaire     Feeling of Stress : Patient declined   Housing Stability: Low Risk  (12/17/2024)    Received from [employer] [facility]    Housing Stability Vital Sign     Unable to Pay for Housing in the Last Year: No     Number of Times Moved in the Last Year: 0     Homeless in the Last Year: No        Family History   Problem Relation Name Age of Onset    Breast cancer Mother  70    Heart disease Father          Current Outpatient Medications   Medication Instructions    albuterol (PROVENTIL HFA) 90 mcg/actuation inhaler 2 puffs, Inhalation, Every 6 hours PRN, Rescue    amLODIPine (NORVASC)  10 mg, Oral, Daily    atorvastatin (LIPITOR) 40 mg, Oral, Nightly    azelastine (ASTELIN) 137 mcg, Nasal, 2 times daily    baclofen (LIORESAL) 10 mg, Oral, Daily PRN    budesonide-glycopyr-formoterol 160-9-4.8 mcg/actuation HFAA 2 puffs, Inhalation, 2 times daily, Rinse mouth after use    cetirizine (ZYRTEC) 10 mg, Oral, Daily    citalopram (CELEXA) 40 MG tablet TAKE ONE(1) TAB BY MOUTH EVERY DAY WITH FOOD FOR DEPRESSION, ANXIETY, &/OR IRRITABILITY    codeine-guaiFENesin 6.3-100 mg/5 mL Liqd 7.5 mLs, Oral, Nightly PRN    isosorbide mononitrate (IMDUR) 60 mg, Oral, 2 times daily    lisinopriL 10 MG tablet TAKE ONE(1) TAB BY MOUTH TWICE DAILY FOR BLOOD PRESSURE    meloxicam (MOBIC) 7.5 mg, Oral, Daily    mirabegron (MYRBETRIQ) 50 mg, Oral, Daily    pantoprazole (PROTONIX) 40 MG tablet TAKE ONE(1) TAB BY MOUTH ONCE A DAY AS NEEDED FOR STOMACH &/OR REFLUX /DO NOT CRUSH OR CHEW       Review of patient's allergies indicates:  No Known Allergies     Immunization History   Administered Date(s) Administered    COVID-19, MRNA, LN-S, PF (Pfizer) (Purple Cap) 03/24/2021, 04/14/2021    Hepatitis A, Adult 10/17/2005    Hepatitis B, Adult 10/17/2005    Influenza 09/09/2009, 10/05/2010    Influenza (FLUAD) - Quadrivalent - Adjuvanted - PF *Preferred* (65+) 11/07/2023    Influenza - Quadrivalent - High Dose - PF (65 years and older) 09/26/2020, 10/07/2021, 11/01/2022    Influenza - Quadrivalent - PF *Preferred* (6 months and older) 11/18/2004, 10/17/2005, 10/13/2006, 09/11/2007, 09/17/2013, 10/01/2014    Influenza - Trivalent - Afluria, Fluzone MDV 11/18/2004, 10/17/2005, 10/13/2006, 09/11/2007, 09/17/2013    Influenza - Trivalent - Fluad - Adjuvanted - PF (65 years and older 10/14/2024    Influenza - Trivalent - Fluarix, Flulaval, Fluzone, Afluria - PF 11/18/2004, 10/17/2005, 10/13/2006, 09/11/2007, 09/17/2013, 10/01/2014    Influenza - Trivalent - Fluzone High Dose - PF (65 years and older) 08/20/2015, 08/22/2016, 10/03/2017,  "09/17/2018, 09/10/2019    Influenza Whole 10/01/2008    Pneumococcal Conjugate - 13 Valent 08/20/2015, 10/03/2017    Pneumococcal Polysaccharide - 23 Valent 08/22/2016    Td (ADULT) 10/17/2005    Td - PF (ADULT) 10/17/2005, 11/05/2024    Zoster Recombinant 04/28/2025        Patient Care Team:  Neena Ryder DNP as PCP - General (Family Medicine)  Jillian Howard MD as Consulting Physician (General Surgery)    Subjective:     Review of Systems   Respiratory:  Positive for cough.    Cardiovascular:  Positive for leg swelling.   All other systems reviewed and are negative.      12 point review of systems conducted, negative except as stated in the history of present illness. See HPI for details.    Objective:     Visit Vitals  /78 (BP Location: Right arm, Patient Position: Sitting)   Pulse 88   Temp 97.8 °F (36.6 °C)   Ht 5' 1" (1.549 m)   Wt 90.4 kg (199 lb 3.2 oz)   SpO2 97%   BMI 37.64 kg/m²       Physical Exam  Constitutional:       Appearance: Normal appearance. She is obese.   HENT:      Head: Normocephalic.      Nose: Nose normal.      Mouth/Throat:      Mouth: Mucous membranes are dry.   Eyes:      Extraocular Movements: Extraocular movements intact.      Conjunctiva/sclera: Conjunctivae normal.   Cardiovascular:      Rate and Rhythm: Normal rate and regular rhythm.      Pulses: Normal pulses.      Heart sounds: Normal heart sounds.   Pulmonary:      Effort: Pulmonary effort is normal. No respiratory distress.      Breath sounds: Normal breath sounds. Decreased air movement present. No wheezing, rhonchi (end expiratory upper scattered) or rales (right lateral base).   Abdominal:      General: Bowel sounds are normal.      Palpations: Abdomen is soft.   Musculoskeletal:         General: Normal range of motion.      Cervical back: Normal range of motion.   Skin:     General: Skin is warm and dry.      Capillary Refill: Capillary refill takes less than 2 seconds.   Neurological:      General: No focal " deficit present.      Mental Status: She is alert and oriented to person, place, and time.   Psychiatric:         Mood and Affect: Mood normal.         Behavior: Behavior normal.         Labs Reviewed:  Chemistry:  Lab Results   Component Value Date     (L) 01/27/2025    K 4.2 01/27/2025    BUN 16 01/27/2025    CREATININE 0.69 01/27/2025    EGFRNORACEVR 86 01/27/2025    GLUCOSE 108 01/27/2025    CALCIUM 10.1 01/27/2025    ALKPHOS 91 01/27/2025    LABPROT 6.4 01/27/2025    ALBUMIN 4.1 01/27/2025    AST 26 01/27/2025    ALT 18 01/27/2025    TYJPMMBS58AT 33 11/04/2024    TSH 2.870 05/07/2024    OUQWWF4JQAH 1.20 05/15/2023        Lab Results   Component Value Date    HGBA1C 5.9 11/04/2024        Hematology:  Lab Results   Component Value Date    WBC 5.53 01/27/2025    RBC 4.27 01/27/2025    HGB 13.2 01/27/2025    HCT 38.4 01/27/2025    MCV 89.9 01/27/2025    MCH 30.9 01/27/2025    MCHC 34.4 01/27/2025    RDW 13.0 01/27/2025     01/27/2025    MPV 10.2 01/27/2025       Lipid Panel:  Lab Results   Component Value Date    CHOL 134 11/04/2024    HDL 65 (H) 11/04/2024    TRIG 64 11/04/2024        Assessment:       ICD-10-CM ICD-9-CM   1. Medicare annual wellness visit, subsequent  Z00.00 V70.0   2. ACP (advance care planning)  Z71.89 V65.49   3. Age-related osteoporosis without current pathological fracture  M81.0 733.01   4. Primary hypertension  I10 401.9   5. Hypertension, unspecified type  I10 401.9   6. Mixed hyperlipidemia  E78.2 272.2   7. Immunization due  Z23 V05.9   8. Simple chronic bronchitis  J41.0 491.0        Plan:     1. Medicare annual wellness visit, subsequent  Assessment & Plan:  Health Maintenance   Topic Date Due    Shingles Vaccine (1 of 2) Never done    RSV Vaccine (Age 60+ and Pregnant patients) (1 - 1-dose 75+ series) 11/06/2029 (Originally 1/18/2016)    COVID-19 Vaccine (3 - Pfizer risk series) 12/12/2112 (Originally 5/12/2021)    Hemoglobin A1c (Prediabetes)  11/04/2025    Mammogram   11/12/2025    DEXA Scan  11/21/2026    Lipid Panel  11/04/2029    TETANUS VACCINE  11/05/2034    Influenza Vaccine  Completed    Pneumococcal Vaccines (Age 50+)  Completed   Shingrix with booster in 2 months.        2. ACP (advance care planning)  Assessment & Plan:  Advance Care Planning no documents on file, 5 Rights Booklet with surrogate discussed with patient.       3. Age-related osteoporosis without current pathological fracture  Overview:  Hip -4.09, recommend beginning prolia injection every 6 months. Recheck DEXA in 2 years. Increase weight bearing exercises.     Assessment & Plan:  Prolia injection every 6 months, recheck DEXA 11/25. The current medical regimen is effective;  continue present plan and medications.        4. Primary hypertension  Assessment & Plan:  Norvasc 10 mg 1 p.o. q.d. lisinopril 10 mg q.d. low-sodium diet. The current medical regimen is effective; continue present plan and medications.       5. Hypertension, unspecified type  -     lisinopriL 10 MG tablet; TAKE ONE(1) TAB BY MOUTH TWICE DAILY FOR BLOOD PRESSURE  Dispense: 180 tablet; Refill: 1    6. Mixed hyperlipidemia  -     atorvastatin (LIPITOR) 40 MG tablet; Take 1 tablet (40 mg total) by mouth every evening.  Dispense: 90 tablet; Refill: 1    7. Immunization due  -     varicella zoster (Shingrix) IM vaccine (>/= 51 yo)    8. Simple chronic bronchitis  Assessment & Plan:  Still with a dry cough lungs clear feeling breathing has improved but not requiring either the Proventil or the Symbicort at this time.  [facility] at bedtime if needed but use the inhalers if needed and call any changes or problems.     Orders:  -     codeine-guaiFENesin 6.3-100 mg/5 mL Liqd; Take 7.5 mLs by mouth nightly as needed (cough).  Dispense: 90 mL; Refill: 0         The following assessments were completed and reviewed. See completed screening forms and assessments within the Encounter Summary.   [x] Health Risk Assessment    [x] CVD Risk Factors   [x] Obesity/Physical Activity -  Encouraged daily 30 minute physical activity x 5 days per week.   [x] Home Safety/Living Situation   [x] Alcohol Screen  [x] Depression (PHQ) Screen   [x] Timed Get Up and Go   [x] Whisper Test  [x] Cognitive Function/Impairment Screen   [x] Nutrition Screening  [x] ADL Screen   [x] Opioid Screen:  [x] Patient does not have a prescription for opioids.   [] Patient has a prescription for opioids but is at low risk for abuse.   [x] Substance Abuse Screen:   [x] Patient does not use substances.   [] Patient screens positive for substance use disorder.   Advance Care Planning     Date: 04/28/2025  Advance Care Planning     Date: 04/28/2025    Today a voluntary meeting took place: Clinic Exam Room    Patient Participation: Patient is able to participate   Staff attendees (Name and  Role): Neena Ryder DNP, COREY    ACP Conversation (General): Other (specify below) difference between will and living will, power of .  CPR, life sustaining measures. 5 Rights booklet.      Code Status: Full Code    ACP Documents: Provided ACP documents    Goals of care: The patient endorses that what is most important right now is to focus on quality of life, even if it means sacrificing a little time    Accordingly, we have decided that the best plan to meet the patient's goals includes continuing with treatment      Recommendations/  Follow-up tasks: The patient and health care agent were provided the following recommendations 5 rights booklet to review       Length of ACP   conversation in minutes: A total of 10 min was spent on advance care planning, goals of care discussion, emotional support, formulating and communicating prognosis and exploring burden/benefit of various approaches of treatment. This discussion occurred on a fully voluntary basis with the verbal consent of the patient and/or family.              I attest to discussing Advance Care Planning with patient  and/or family member.  Education was provided including the importance of the Health Care Power of , Advance Directives, and/or [place] documentation.  The patient expressed understanding to the importance of this information and discussion.    Provided patient with a 5-10 year written screening schedule and personal prevention plan. Recommendations were developed using the USPSTF age appropriate recommendations. Education, counseling, and referrals were provided as needed. After Visit Summary printed and given to patient, which includes a list of additional screenings\tests needed.    Follow up in about 2 months (around 6/28/2025). In addition to their scheduled follow up, the patient has also been instructed to follow up on as needed basis.     Future Appointments   Date Time Provider Department Center   6/16/2025 10:00 AM CHAIR 02, [facility] CHEMOTHERAPY INFUSION [facility]   8/4/2025  7:05 AM NURSE, [facility] FAMILY MEDICINE [facility] DANILO Chacko   8/6/2025  8:00 AM Neena Ryder DNP [facility]   4/29/2026  8:00 AM [facility]

## 2025-04-28 NOTE — ASSESSMENT & PLAN NOTE
Health Maintenance   Topic Date Due    Shingles Vaccine (1 of 2) Never done    RSV Vaccine (Age 60+ and Pregnant patients) (1 - 1-dose 75+ series) 11/06/2029 (Originally 1/18/2016)    COVID-19 Vaccine (3 - Pfizer risk series) 12/12/2112 (Originally 5/12/2021)    Hemoglobin A1c (Prediabetes)  11/04/2025    Mammogram  11/12/2025    DEXA Scan  11/21/2026    Lipid Panel  11/04/2029    TETANUS VACCINE  11/05/2034    Influenza Vaccine  Completed    Pneumococcal Vaccines (Age 50+)  Completed   Shingrix with booster in 2 months.

## 2025-06-11 DIAGNOSIS — I10 PRIMARY HYPERTENSION: ICD-10-CM

## 2025-06-11 PROCEDURE — 82306 VITAMIN D 25 HYDROXY: CPT | Performed by: NURSE PRACTITIONER

## 2025-06-11 PROCEDURE — 83036 HEMOGLOBIN GLYCOSYLATED A1C: CPT | Performed by: NURSE PRACTITIONER

## 2025-06-11 PROCEDURE — 80053 COMPREHEN METABOLIC PANEL: CPT | Performed by: NURSE PRACTITIONER

## 2025-06-11 PROCEDURE — 84443 ASSAY THYROID STIM HORMONE: CPT | Performed by: NURSE PRACTITIONER

## 2025-06-11 PROCEDURE — 80061 LIPID PANEL: CPT | Performed by: NURSE PRACTITIONER

## 2025-06-11 RX ORDER — ISOSORBIDE MONONITRATE 60 MG/1
TABLET, EXTENDED RELEASE ORAL
Qty: 60 TABLET | Refills: 2 | Status: SHIPPED | OUTPATIENT
Start: 2025-06-11

## 2025-06-11 RX ORDER — AMLODIPINE BESYLATE 10 MG/1
TABLET ORAL
Qty: 90 TABLET | Refills: 1 | OUTPATIENT
Start: 2025-06-11

## 2025-06-12 ENCOUNTER — TELEPHONE (OUTPATIENT)
Dept: FAMILY MEDICINE | Facility: CLINIC | Age: 85
End: 2025-06-12
Payer: MEDICARE

## 2025-06-12 ENCOUNTER — RESULTS FOLLOW-UP (OUTPATIENT)
Dept: FAMILY MEDICINE | Facility: CLINIC | Age: 85
End: 2025-06-12

## 2025-06-12 NOTE — TELEPHONE ENCOUNTER
----- Message from Neena Ryder DNP sent at 6/12/2025  7:23 AM CDT -----  Notify all stable with current medications. Refill and continue current medications recheck cbc, vitamin D, cmp, flp in 6 months  ----- Message -----  From: Lab, Background User  Sent: 6/11/2025   3:42 PM CDT  To: Neena Ryder DNP

## 2025-06-16 ENCOUNTER — INFUSION (OUTPATIENT)
Facility: HOSPITAL | Age: 85
End: 2025-06-16
Attending: NURSE PRACTITIONER
Payer: MEDICARE

## 2025-06-16 VITALS
OXYGEN SATURATION: 95 % | WEIGHT: 200.81 LBS | DIASTOLIC BLOOD PRESSURE: 75 MMHG | HEART RATE: 90 BPM | RESPIRATION RATE: 20 BRPM | SYSTOLIC BLOOD PRESSURE: 142 MMHG | HEIGHT: 61 IN | BODY MASS INDEX: 37.91 KG/M2 | TEMPERATURE: 98 F

## 2025-06-16 DIAGNOSIS — M81.0 AGE-RELATED OSTEOPOROSIS WITHOUT CURRENT PATHOLOGICAL FRACTURE: Primary | ICD-10-CM

## 2025-06-16 PROCEDURE — 63600175 PHARM REV CODE 636 W HCPCS: Mod: JZ | Performed by: NURSE PRACTITIONER

## 2025-06-16 PROCEDURE — 96372 THER/PROPH/DIAG INJ SC/IM: CPT

## 2025-06-16 RX ADMIN — DENOSUMAB 60 MG: 60 INJECTION SUBCUTANEOUS at 09:06

## 2025-07-07 DIAGNOSIS — M19.90 ARTHRITIS: ICD-10-CM

## 2025-07-07 RX ORDER — MELOXICAM 7.5 MG/1
TABLET ORAL
Qty: 60 TABLET | Refills: 2 | Status: SHIPPED | OUTPATIENT
Start: 2025-07-07

## 2025-08-06 ENCOUNTER — OFFICE VISIT (OUTPATIENT)
Dept: FAMILY MEDICINE | Facility: CLINIC | Age: 85
End: 2025-08-06
Payer: MEDICARE

## 2025-08-06 VITALS
SYSTOLIC BLOOD PRESSURE: 132 MMHG | OXYGEN SATURATION: 97 % | RESPIRATION RATE: 20 BRPM | HEART RATE: 83 BPM | TEMPERATURE: 97 F | WEIGHT: 195 LBS | HEIGHT: 61 IN | DIASTOLIC BLOOD PRESSURE: 76 MMHG | BODY MASS INDEX: 36.82 KG/M2

## 2025-08-06 DIAGNOSIS — F33.42 RECURRENT MAJOR DEPRESSIVE DISORDER, IN FULL REMISSION: ICD-10-CM

## 2025-08-06 DIAGNOSIS — I10 PRIMARY HYPERTENSION: ICD-10-CM

## 2025-08-06 DIAGNOSIS — J30.2 SEASONAL ALLERGIC RHINITIS, UNSPECIFIED TRIGGER: ICD-10-CM

## 2025-08-06 DIAGNOSIS — J41.0 SIMPLE CHRONIC BRONCHITIS: ICD-10-CM

## 2025-08-06 DIAGNOSIS — K21.9 GASTROESOPHAGEAL REFLUX DISEASE WITHOUT ESOPHAGITIS: ICD-10-CM

## 2025-08-06 DIAGNOSIS — M19.90 ARTHRITIS: ICD-10-CM

## 2025-08-06 DIAGNOSIS — N32.81 OVERACTIVE BLADDER: ICD-10-CM

## 2025-08-06 DIAGNOSIS — Z23 IMMUNIZATION DUE: Primary | ICD-10-CM

## 2025-08-06 DIAGNOSIS — C50.912 INVASIVE DUCTAL CARCINOMA OF BREAST, FEMALE, LEFT: ICD-10-CM

## 2025-08-06 PROBLEM — J06.9 UPPER RESPIRATORY TRACT INFECTION: Status: RESOLVED | Noted: 2025-03-24 | Resolved: 2025-08-06

## 2025-08-06 RX ORDER — AZELASTINE 1 MG/ML
1 SPRAY, METERED NASAL 2 TIMES DAILY
Qty: 30 ML | Refills: 1 | Status: SHIPPED | OUTPATIENT
Start: 2025-08-06 | End: 2026-08-06

## 2025-08-06 RX ORDER — DICLOFENAC SODIUM 10 MG/G
2 GEL TOPICAL 4 TIMES DAILY
Qty: 150 G | Refills: 1 | Status: SHIPPED | OUTPATIENT
Start: 2025-08-06

## 2025-08-06 NOTE — ASSESSMENT & PLAN NOTE
Protonix 40 mg daily for increased symptom relief.  The current medical regimen is effective;  continue present plan and medications.

## 2025-08-06 NOTE — ASSESSMENT & PLAN NOTE
Recent visit with Dr Osuna, oncologist for Malignant neoplasm of overlapping sites of left breast in female, estrogen receptor positive (HCC) and scheduled follow up with surveillance with Jillian Howard 8/11/25 and oncology in 12/25. Doing well at this time.

## 2025-08-06 NOTE — PROGRESS NOTES
Patient ID: Leigh Ann Rubio  : 1940     Chief Complaint: 3 month follow up  Allergies: Patient has no known allergies.     History of Present Illness:  The patient is a 85 y.o. White female who presents to clinic for evaluation and management with a chief complaint of 3 month follow up on hypertension.   HPI   Patient visit for follow up on blood pressure. Does not check blood pressure at home. Denies SOB, chest pains, dizziness or headaches. Recently met Dr Osuna, all medication working well. Willing to take shingles vaccine.     Past Medical History:  has a past medical history of History of iron deficiency, Invasive ductal carcinoma of breast, female, left, Mixed hyperlipidemia, Obesity, unspecified, Prediabetes, and Vitamin D deficiency.    Social History:  reports that she has never smoked. She has never been exposed to tobacco smoke. She has never used smokeless tobacco. She reports that she does not drink alcohol and does not use drugs.    Care Team: Patient Care Team:  Neena Ryder DNP as PCP - General (Family Medicine)  Jillian Howard MD as Consulting Physician (General Surgery)     Current Medications:  Current Outpatient Medications   Medication Instructions    albuterol (PROVENTIL HFA) 90 mcg/actuation inhaler 2 puffs, Inhalation, Every 6 hours PRN, Rescue    amLODIPine (NORVASC) 10 mg, Oral, Daily    atorvastatin (LIPITOR) 40 mg, Oral, Nightly    azelastine (ASTELIN) 137 mcg, Nasal, 2 times daily    baclofen (LIORESAL) 10 mg, Oral, Daily PRN    budesonide-glycopyr-formoterol 160-9-4.8 mcg/actuation HFAA 2 puffs, Inhalation, 2 times daily, Rinse mouth after use    cetirizine (ZYRTEC) 10 mg, Oral, Daily    citalopram (CELEXA) 40 MG tablet TAKE ONE(1) TAB BY MOUTH EVERY DAY WITH FOOD FOR DEPRESSION, ANXIETY, &/OR IRRITABILITY    diclofenac sodium (VOLTAREN) 2 g, Topical (Top), 4 times daily    isosorbide mononitrate (IMDUR) 60 MG 24 hr tablet TAKE ONE(1) TAB BY MOUTH TWICE DAILY FOR  "BLOOD PRESSURE &/OR HEART /DO NOT CRUSH OR CHEW    lisinopriL 10 MG tablet TAKE ONE(1) TAB BY MOUTH TWICE DAILY FOR BLOOD PRESSURE    mirabegron (MYRBETRIQ) 50 mg, Oral, Daily    pantoprazole (PROTONIX) 40 MG tablet TAKE ONE(1) TAB BY MOUTH ONCE A DAY AS NEEDED FOR STOMACH &/OR REFLUX /DO NOT CRUSH OR CHEW       Review of Systems   Cardiovascular:  Positive for leg swelling.   Musculoskeletal:  Positive for arthralgias and myalgias.   All other systems reviewed and are negative.       Visit Vitals  /76 (BP Location: Left arm, Patient Position: Sitting)   Pulse 83   Temp 97 °F (36.1 °C) (Temporal)   Resp 20   Ht 5' 1" (1.549 m)   Wt 88.5 kg (195 lb)   SpO2 97%   BMI 36.84 kg/m²       Physical Exam  Vitals and nursing note reviewed.   Constitutional:       Appearance: Normal appearance.   HENT:      Head: Normocephalic.      Nose: Rhinorrhea present.      Mouth/Throat:      Mouth: Mucous membranes are dry.   Eyes:      Extraocular Movements: Extraocular movements intact.      Conjunctiva/sclera: Conjunctivae normal.   Cardiovascular:      Rate and Rhythm: Normal rate and regular rhythm.      Pulses: Normal pulses.      Heart sounds: Normal heart sounds.   Pulmonary:      Effort: Pulmonary effort is normal.      Breath sounds: Normal breath sounds.   Chest:      Comments: Left breast surgically absent.   Abdominal:      General: Bowel sounds are normal.      Palpations: Abdomen is soft.   Musculoskeletal:         General: Normal range of motion.      Cervical back: Normal range of motion.   Skin:     General: Skin is warm and dry.      Capillary Refill: Capillary refill takes less than 2 seconds.   Neurological:      General: No focal deficit present.      Mental Status: She is alert.   Psychiatric:         Mood and Affect: Mood normal.          Labs Reviewed:  Chemistry:  Lab Results   Component Value Date     (L) 06/11/2025    K 4.5 06/11/2025    BUN 12 06/11/2025    CREATININE 0.59 (L) 06/11/2025    " EGFRNORACEVR 89 06/11/2025    CALCIUM 10.0 06/11/2025    ALKPHOS 92 06/11/2025    ALBUMIN 4.0 06/11/2025    AST 43 (H) 06/11/2025    ALT 23 06/11/2025    [id number] 36 06/11/2025    TSH 2.990 06/11/2025    [id number] 1.20 05/15/2023        Lab Results   Component Value Date    HGBA1C 5.8 06/11/2025        Hematology:  Lab Results   Component Value Date    WBC 5.53 01/27/2025    RBC 4.27 01/27/2025    HGB 13.2 01/27/2025    HCT 38.4 01/27/2025    MCV 89.9 01/27/2025    MCH 30.9 01/27/2025    MCHC 34.4 01/27/2025    RDW 13.0 01/27/2025     01/27/2025    MPV 10.2 01/27/2025       Lipid Panel:  Lab Results   Component Value Date    CHOL 129 06/11/2025    HDL 59 06/11/2025    LDLCALC 65.3 10/13/2021    LDLDIRECT 37.3 06/11/2025    TRIG 72 06/11/2025        Assessment & Plan:  1. Immunization due  Assessment & Plan:  Due for booster shingles vaccine. Discussed risks versus benefits.       2. Overactive bladder  Assessment & Plan:  Myrbetriq 50 mg qod. Helping to decrease nocturnal urination and less dribbling.  Some mornings more early am symptoms than others.       3. Primary hypertension  Assessment & Plan:  Norvasc 10 mg 1 p.o. q.d. lisinopril 10 mg q.d. low-sodium diet. The current medical regimen is effective; continue present plan and medications.       4. Recurrent major depressive disorder, in full remission  Assessment & Plan:  Celexa 40 mg daily working well for symptoms. The current medical regimen is effective; continue present plan and medications.       5. Seasonal allergic rhinitis, unspecified trigger  Assessment & Plan:  Zyrtec 10 mg daily, astelin nasal spray, one spray bid prn allergies. The current medical regimen is effective; continue present plan and medications.     Orders:  -     azelastine (ASTELIN) 137 mcg (0.1 %) nasal spray; 1 spray (137 mcg total) by Nasal route 2 (two) times daily.  Dispense: 30 mL; Refill: 1    6. Simple chronic bronchitis  Assessment & Plan:  Has Proventil and  Symbicort on hand. Symptoms improved at this time. The current medical regimen is effective;  continue present plan and medications.         7. Gastroesophageal reflux disease without esophagitis  Assessment & Plan:  Protonix 40 mg daily for increased symptom relief.  The current medical regimen is effective;  continue present plan and medications.         8. Invasive ductal carcinoma of breast, female, left  Overview:  History left breast cancer >20 years ago. History resection with radiation treatment and Tomaxifen x 5 years following treatment. Remaining up to date with follow up imaging yearly, but repeat imaging recurrence Invasive Ductal Carcinoma of Breast scar. Discuss findings with patient refer to breast surgeon, Dr Howard and needs oncologist, dr Osuna. Avoid NSAID or mobic for 7 days prior to possible surgery.  Counseling for 25 minutes all questions answered and addressed.     Assessment & Plan:  Recent visit with Dr Osuna, oncologist for Malignant neoplasm of overlapping sites of left breast in female, estrogen receptor positive (HCC) and scheduled follow up with surveillance with Jillian Howard 8/11/25 and oncology in 12/25. Doing well at this time.       9. Arthritis  Assessment & Plan:  Taking mobic 7.5 mg daily prn. Tylenol arthritis safer. May try topical cream, voltaren gel if needed.     Orders:  -     diclofenac sodium (VOLTAREN) 1 % Gel; Apply 2 g topically 4 (four) times daily.  Dispense: 150 g; Refill: 1         Future Appointments   Date Time Provider Department Center   12/8/2025  7:20 AM NURSE, [facility] FAMILY MEDICINE [facility] DANILO [place]   12/17/2025  1:00 PM INJECTION CHAIR, [facility] CHEMOTHERAPY INFUSION OA CHEMO [place]   4/29/2026  8:00 AM [facility] [place]       Follow up in about 2 months (around 10/6/2025). Call sooner if needed.    Neena Ryder DNP    Lab Frequency Next Occurrence   X-Ray Chest PA And Lateral Once 03/31/2025

## 2025-08-06 NOTE — ASSESSMENT & PLAN NOTE
Has Proventil and Symbicort on hand. Symptoms improved at this time. The current medical regimen is effective;  continue present plan and medications.

## 2025-08-06 NOTE — ASSESSMENT & PLAN NOTE
Taking mobic 7.5 mg daily prn. Tylenol arthritis safer. May try topical cream, voltaren gel if needed.

## 2025-08-20 DIAGNOSIS — N32.81 OVERACTIVE BLADDER: ICD-10-CM

## 2025-08-20 RX ORDER — MIRABEGRON 50 MG/1
1 TABLET, FILM COATED, EXTENDED RELEASE ORAL DAILY
Qty: 30 TABLET | Refills: 2 | Status: SHIPPED | OUTPATIENT
Start: 2025-08-20

## 2025-08-27 DIAGNOSIS — M62.830 MUSCLE SPASM OF BACK: ICD-10-CM

## 2025-08-27 RX ORDER — BACLOFEN 10 MG/1
TABLET ORAL
Qty: 30 TABLET | Refills: 2 | Status: SHIPPED | OUTPATIENT
Start: 2025-08-27

## 2025-09-04 DIAGNOSIS — K21.9 GASTROESOPHAGEAL REFLUX DISEASE, UNSPECIFIED WHETHER ESOPHAGITIS PRESENT: ICD-10-CM

## 2025-09-04 DIAGNOSIS — I10 PRIMARY HYPERTENSION: ICD-10-CM

## 2025-09-04 RX ORDER — AMLODIPINE BESYLATE 10 MG/1
TABLET ORAL
Qty: 90 TABLET | Refills: 1 | Status: SHIPPED | OUTPATIENT
Start: 2025-09-04

## 2025-09-04 RX ORDER — PANTOPRAZOLE SODIUM 40 MG/1
TABLET, DELAYED RELEASE ORAL
Qty: 70 TABLET | Refills: 1 | Status: SHIPPED | OUTPATIENT
Start: 2025-09-04